# Patient Record
Sex: MALE | Race: WHITE | NOT HISPANIC OR LATINO | Employment: FULL TIME | URBAN - METROPOLITAN AREA
[De-identification: names, ages, dates, MRNs, and addresses within clinical notes are randomized per-mention and may not be internally consistent; named-entity substitution may affect disease eponyms.]

---

## 2023-08-10 ENCOUNTER — LAB (OUTPATIENT)
Dept: LAB | Facility: LAB | Age: 33
End: 2023-08-10
Payer: COMMERCIAL

## 2023-08-10 ENCOUNTER — OFFICE VISIT (OUTPATIENT)
Dept: PRIMARY CARE | Facility: CLINIC | Age: 33
End: 2023-08-10
Payer: COMMERCIAL

## 2023-08-10 VITALS
WEIGHT: 201.2 LBS | SYSTOLIC BLOOD PRESSURE: 108 MMHG | BODY MASS INDEX: 28.8 KG/M2 | DIASTOLIC BLOOD PRESSURE: 71 MMHG | TEMPERATURE: 98.3 F | HEIGHT: 70 IN | HEART RATE: 74 BPM | OXYGEN SATURATION: 96 %

## 2023-08-10 DIAGNOSIS — K92.1 HEMATOCHEZIA: ICD-10-CM

## 2023-08-10 DIAGNOSIS — R19.7 DIARRHEA, UNSPECIFIED TYPE: Primary | ICD-10-CM

## 2023-08-10 DIAGNOSIS — R19.7 DIARRHEA, UNSPECIFIED TYPE: ICD-10-CM

## 2023-08-10 LAB
ALANINE AMINOTRANSFERASE (SGPT) (U/L) IN SER/PLAS: 22 U/L (ref 10–52)
ALBUMIN (G/DL) IN SER/PLAS: 4.6 G/DL (ref 3.4–5)
ALKALINE PHOSPHATASE (U/L) IN SER/PLAS: 56 U/L (ref 33–120)
ANION GAP IN SER/PLAS: 10 MMOL/L (ref 10–20)
ASPARTATE AMINOTRANSFERASE (SGOT) (U/L) IN SER/PLAS: 19 U/L (ref 9–39)
BASOPHILS (10*3/UL) IN BLOOD BY AUTOMATED COUNT: 0.08 X10E9/L (ref 0–0.1)
BASOPHILS/100 LEUKOCYTES IN BLOOD BY AUTOMATED COUNT: 1.5 % (ref 0–2)
BILIRUBIN TOTAL (MG/DL) IN SER/PLAS: 0.5 MG/DL (ref 0–1.2)
CALCIUM (MG/DL) IN SER/PLAS: 9.7 MG/DL (ref 8.6–10.3)
CARBON DIOXIDE, TOTAL (MMOL/L) IN SER/PLAS: 29 MMOL/L (ref 21–32)
CHLORIDE (MMOL/L) IN SER/PLAS: 104 MMOL/L (ref 98–107)
CREATININE (MG/DL) IN SER/PLAS: 1.18 MG/DL (ref 0.5–1.3)
DEAMIDATED GLIADIN PEPTIDE IGA: <1 U/ML (ref 0–14)
DEAMIDATED GLIADIN PEPTIDE IGG: <1 U/ML (ref 0–14)
EOSINOPHILS (10*3/UL) IN BLOOD BY AUTOMATED COUNT: 0.16 X10E9/L (ref 0–0.7)
EOSINOPHILS/100 LEUKOCYTES IN BLOOD BY AUTOMATED COUNT: 2.9 % (ref 0–6)
ERYTHROCYTE DISTRIBUTION WIDTH (RATIO) BY AUTOMATED COUNT: 12.9 % (ref 11.5–14.5)
ERYTHROCYTE MEAN CORPUSCULAR HEMOGLOBIN CONCENTRATION (G/DL) BY AUTOMATED: 31.7 G/DL (ref 32–36)
ERYTHROCYTE MEAN CORPUSCULAR VOLUME (FL) BY AUTOMATED COUNT: 91 FL (ref 80–100)
ERYTHROCYTES (10*6/UL) IN BLOOD BY AUTOMATED COUNT: 5.21 X10E12/L (ref 4.5–5.9)
GFR MALE: 84 ML/MIN/1.73M2
GLUCOSE (MG/DL) IN SER/PLAS: 97 MG/DL (ref 74–99)
HEMATOCRIT (%) IN BLOOD BY AUTOMATED COUNT: 47.6 % (ref 41–52)
HEMOGLOBIN (G/DL) IN BLOOD: 15.1 G/DL (ref 13.5–17.5)
IMMATURE GRANULOCYTES/100 LEUKOCYTES IN BLOOD BY AUTOMATED COUNT: 0.2 % (ref 0–0.9)
LEUKOCYTES (10*3/UL) IN BLOOD BY AUTOMATED COUNT: 5.4 X10E9/L (ref 4.4–11.3)
LYMPHOCYTES (10*3/UL) IN BLOOD BY AUTOMATED COUNT: 1.84 X10E9/L (ref 1.2–4.8)
LYMPHOCYTES/100 LEUKOCYTES IN BLOOD BY AUTOMATED COUNT: 33.9 % (ref 13–44)
MONOCYTES (10*3/UL) IN BLOOD BY AUTOMATED COUNT: 0.62 X10E9/L (ref 0.1–1)
MONOCYTES/100 LEUKOCYTES IN BLOOD BY AUTOMATED COUNT: 11.4 % (ref 2–10)
NEUTROPHILS (10*3/UL) IN BLOOD BY AUTOMATED COUNT: 2.72 X10E9/L (ref 1.2–7.7)
NEUTROPHILS/100 LEUKOCYTES IN BLOOD BY AUTOMATED COUNT: 50.1 % (ref 40–80)
PLATELETS (10*3/UL) IN BLOOD AUTOMATED COUNT: 210 X10E9/L (ref 150–450)
POTASSIUM (MMOL/L) IN SER/PLAS: 4.3 MMOL/L (ref 3.5–5.3)
PROTEIN TOTAL: 6.9 G/DL (ref 6.4–8.2)
SEDIMENTATION RATE, ERYTHROCYTE: 2 MM/H (ref 0–15)
SODIUM (MMOL/L) IN SER/PLAS: 139 MMOL/L (ref 136–145)
THYROTROPIN (MIU/L) IN SER/PLAS BY DETECTION LIMIT <= 0.05 MIU/L: 1.78 MIU/L (ref 0.44–3.98)
THYROXINE (T4) FREE (NG/DL) IN SER/PLAS: 0.94 NG/DL (ref 0.61–1.12)
TISSUE TRANSGLUTAMINASE IGG: <1 U/ML (ref 0–14)
TISSUE TRANSGLUTAMINASE, IGA: <1 U/ML (ref 0–14)
UREA NITROGEN (MG/DL) IN SER/PLAS: 24 MG/DL (ref 6–23)

## 2023-08-10 PROCEDURE — 85652 RBC SED RATE AUTOMATED: CPT

## 2023-08-10 PROCEDURE — 84443 ASSAY THYROID STIM HORMONE: CPT

## 2023-08-10 PROCEDURE — 84439 ASSAY OF FREE THYROXINE: CPT

## 2023-08-10 PROCEDURE — 99213 OFFICE O/P EST LOW 20 MIN: CPT | Performed by: FAMILY MEDICINE

## 2023-08-10 PROCEDURE — 83516 IMMUNOASSAY NONANTIBODY: CPT

## 2023-08-10 PROCEDURE — 36415 COLL VENOUS BLD VENIPUNCTURE: CPT

## 2023-08-10 PROCEDURE — 80053 COMPREHEN METABOLIC PANEL: CPT

## 2023-08-10 PROCEDURE — 85025 COMPLETE CBC W/AUTO DIFF WBC: CPT

## 2023-08-10 PROCEDURE — 1036F TOBACCO NON-USER: CPT | Performed by: FAMILY MEDICINE

## 2023-08-10 ASSESSMENT — ENCOUNTER SYMPTOMS
FREQUENCY: 0
JOINT SWELLING: 0
FEVER: 0
FATIGUE: 0
DIFFICULTY URINATING: 0
ARTHRALGIAS: 0
DIAPHORESIS: 0
UNEXPECTED WEIGHT CHANGE: 0
APPETITE CHANGE: 0

## 2023-08-10 NOTE — PROGRESS NOTES
"Subjective   Patient ID: Dany Lawson is a 33 y.o. male who presents for Establish Care (To establish care. Discuss stomach discomfort and frequent bowel movements.  Has had blood in stool about 1 month ago.  ).    New patient.     Has always had some GI issues. Defecates 3 times in am every day, then more rest of day. 7-8 times a day. Soft, not liquid. Never totally solid. Has had blood in stool couple times. Last time one month ago. Has happened 2 times when stool is pellety, wipes and has blood. No rectal or abdominal pain. Sour stomach at times. No nausea, vomiting or weight loss. No family history of colon issues. Diet does not affect stool patterns. Has tried cutting back on dairy. Not cut out bread or wheat   products.     Dry spot in antecubital space left arm. Clears up with Eucerin.          No current outpatient medications on file.    Patient Active Problem List   Diagnosis    Diarrhea    Hematochezia         Review of Systems   Constitutional:  Negative for appetite change, diaphoresis, fatigue, fever and unexpected weight change.   Genitourinary:  Negative for difficulty urinating and frequency.   Musculoskeletal:  Negative for arthralgias and joint swelling.   Skin:  Negative for rash.       Objective   /71 (BP Location: Left arm, Patient Position: Sitting)   Pulse 74   Temp 36.8 °C (98.3 °F)   Ht 1.778 m (5' 10\")   Wt 91.3 kg (201 lb 3.2 oz)   SpO2 96%   BMI 28.87 kg/m²     Physical Exam  Vitals reviewed.   Constitutional:       General: He is not in acute distress.     Appearance: He is not ill-appearing.   Neck:      Vascular: No carotid bruit.   Cardiovascular:      Rate and Rhythm: Normal rate and regular rhythm.      Pulses: Normal pulses.      Heart sounds: No murmur heard.  Pulmonary:      Effort: Pulmonary effort is normal.      Breath sounds: Normal breath sounds.   Abdominal:      General: Abdomen is flat. Bowel sounds are normal.      Palpations: Abdomen is soft. "   Musculoskeletal:      Left lower leg: No edema.   Skin:     Findings: No rash.   Neurological:      Mental Status: He is alert.   Psychiatric:         Mood and Affect: Mood normal.         Assessment/Plan   Problem List Items Addressed This Visit       Diarrhea - Primary     Chronic, ?inflammatory bowel disease or Crohns. Check labs, Refer to Gi.          Relevant Orders    CBC and Auto Differential    Sedimentation Rate    Celiac Panel    Thyroid Stimulating Hormone    Thyroxine, Free    Comprehensive Metabolic Panel    Referral to Gastroenterology    Hematochezia     Refer to GI. Etiology unclear.          Relevant Orders    CBC and Auto Differential    Referral to Gastroenterology         Assessment, plans, tests, and follow up discussed with patient and patient verbalized understanding. Dany was given an opportunity to ask questions and  any concerns were addressed including but not limited to meds, follow up. .

## 2023-11-03 ENCOUNTER — LAB (OUTPATIENT)
Dept: LAB | Facility: LAB | Age: 33
End: 2023-11-03
Payer: COMMERCIAL

## 2023-11-03 DIAGNOSIS — K62.5 HEMORRHAGE OF ANUS AND RECTUM: Primary | ICD-10-CM

## 2023-11-03 PROCEDURE — 83993 ASSAY FOR CALPROTECTIN FECAL: CPT

## 2023-11-06 ENCOUNTER — TELEPHONE (OUTPATIENT)
Dept: GASTROENTEROLOGY | Facility: CLINIC | Age: 33
End: 2023-11-06
Payer: COMMERCIAL

## 2023-11-06 NOTE — LETTER
November 8, 2023     Dany Lawson    Patient: Dany Lawson   YOB: 1990   Date of Visit: 11/6/2023       To whom it may concern:    This patient has been under my care for some GI concerns which he is currently having evaluated. He is scheduled for a procedure and I am requesting that he be excused from jury duty from 11/27/2023 - 11/28/2023 for this procedure.     Please call me with any concerns [phone]     Sincerely,         Risa Hill, CNP        CC: No Recipients  ______________________________________________________________________________________

## 2023-11-08 LAB — CALPROTECTIN STL-MCNT: 23 UG/G

## 2023-11-27 ENCOUNTER — ANESTHESIA EVENT (OUTPATIENT)
Dept: GASTROENTEROLOGY | Facility: HOSPITAL | Age: 33
End: 2023-11-27
Payer: COMMERCIAL

## 2023-11-28 ENCOUNTER — ANESTHESIA (OUTPATIENT)
Dept: GASTROENTEROLOGY | Facility: HOSPITAL | Age: 33
End: 2023-11-28
Payer: COMMERCIAL

## 2023-11-28 ENCOUNTER — HOSPITAL ENCOUNTER (OUTPATIENT)
Dept: GASTROENTEROLOGY | Facility: HOSPITAL | Age: 33
Discharge: HOME | End: 2023-11-28
Payer: COMMERCIAL

## 2023-11-28 VITALS
BODY MASS INDEX: 27.3 KG/M2 | HEIGHT: 71 IN | TEMPERATURE: 98.3 F | WEIGHT: 195 LBS | OXYGEN SATURATION: 96 % | RESPIRATION RATE: 16 BRPM | SYSTOLIC BLOOD PRESSURE: 107 MMHG | HEART RATE: 58 BPM | DIASTOLIC BLOOD PRESSURE: 75 MMHG

## 2023-11-28 DIAGNOSIS — Z12.11 ENCOUNTER FOR SCREENING FOR MALIGNANT NEOPLASM OF COLON: ICD-10-CM

## 2023-11-28 DIAGNOSIS — R19.7 DIARRHEA, UNSPECIFIED TYPE: ICD-10-CM

## 2023-11-28 DIAGNOSIS — K62.5 RECTAL BLEEDING: ICD-10-CM

## 2023-11-28 PROCEDURE — 2500000004 HC RX 250 GENERAL PHARMACY W/ HCPCS (ALT 636 FOR OP/ED): Performed by: NURSE ANESTHETIST, CERTIFIED REGISTERED

## 2023-11-28 PROCEDURE — 0753T DGTZ GLS MCRSCP SLD LEVEL IV: CPT | Mod: TC,SUR,PORLAB | Performed by: INTERNAL MEDICINE

## 2023-11-28 PROCEDURE — 2500000005 HC RX 250 GENERAL PHARMACY W/O HCPCS: Performed by: NURSE ANESTHETIST, CERTIFIED REGISTERED

## 2023-11-28 PROCEDURE — 88305 TISSUE EXAM BY PATHOLOGIST: CPT | Performed by: SPECIALIST

## 2023-11-28 PROCEDURE — 7100000010 HC PHASE TWO TIME - EACH INCREMENTAL 1 MINUTE: Performed by: INTERNAL MEDICINE

## 2023-11-28 PROCEDURE — 3700000002 HC GENERAL ANESTHESIA TIME - EACH INCREMENTAL 1 MINUTE: Performed by: INTERNAL MEDICINE

## 2023-11-28 PROCEDURE — 88305 TISSUE EXAM BY PATHOLOGIST: CPT | Mod: TC,PORLAB | Performed by: INTERNAL MEDICINE

## 2023-11-28 PROCEDURE — 45380 COLONOSCOPY AND BIOPSY: CPT | Performed by: INTERNAL MEDICINE

## 2023-11-28 PROCEDURE — 2500000004 HC RX 250 GENERAL PHARMACY W/ HCPCS (ALT 636 FOR OP/ED): Performed by: INTERNAL MEDICINE

## 2023-11-28 PROCEDURE — 7100000009 HC PHASE TWO TIME - INITIAL BASE CHARGE: Performed by: INTERNAL MEDICINE

## 2023-11-28 PROCEDURE — 94760 N-INVAS EAR/PLS OXIMETRY 1: CPT

## 2023-11-28 PROCEDURE — 3700000001 HC GENERAL ANESTHESIA TIME - INITIAL BASE CHARGE: Performed by: INTERNAL MEDICINE

## 2023-11-28 RX ORDER — PROPOFOL 10 MG/ML
INJECTION, EMULSION INTRAVENOUS AS NEEDED
Status: DISCONTINUED | OUTPATIENT
Start: 2023-11-28 | End: 2023-11-28

## 2023-11-28 RX ORDER — FENTANYL CITRATE 50 UG/ML
INJECTION, SOLUTION INTRAMUSCULAR; INTRAVENOUS AS NEEDED
Status: DISCONTINUED | OUTPATIENT
Start: 2023-11-28 | End: 2023-11-28

## 2023-11-28 RX ORDER — NAPROXEN SODIUM 220 MG/1
TABLET ORAL
COMMUNITY

## 2023-11-28 RX ORDER — FERROUS SULFATE, DRIED 160(50) MG
1 TABLET, EXTENDED RELEASE ORAL DAILY
COMMUNITY

## 2023-11-28 RX ORDER — GUAIFENESIN 600 MG/1
1200 TABLET, EXTENDED RELEASE ORAL 2 TIMES DAILY
COMMUNITY
End: 2024-06-07 | Stop reason: ALTCHOICE

## 2023-11-28 RX ORDER — DIPHENHYDRAMINE HCL 25 MG
25 TABLET ORAL
COMMUNITY
End: 2024-06-07 | Stop reason: ALTCHOICE

## 2023-11-28 RX ORDER — LIDOCAINE HYDROCHLORIDE 20 MG/ML
INJECTION, SOLUTION EPIDURAL; INFILTRATION; INTRACAUDAL; PERINEURAL AS NEEDED
Status: DISCONTINUED | OUTPATIENT
Start: 2023-11-28 | End: 2023-11-28

## 2023-11-28 RX ORDER — SODIUM CHLORIDE 9 MG/ML
20 INJECTION, SOLUTION INTRAVENOUS CONTINUOUS
Status: DISCONTINUED | OUTPATIENT
Start: 2023-11-28 | End: 2023-11-29 | Stop reason: HOSPADM

## 2023-11-28 RX ADMIN — PROPOFOL 50 MG: 10 INJECTION, EMULSION INTRAVENOUS at 08:14

## 2023-11-28 RX ADMIN — PROPOFOL 50 MG: 10 INJECTION, EMULSION INTRAVENOUS at 08:12

## 2023-11-28 RX ADMIN — PROPOFOL 100 MG: 10 INJECTION, EMULSION INTRAVENOUS at 08:04

## 2023-11-28 RX ADMIN — PROPOFOL 50 MG: 10 INJECTION, EMULSION INTRAVENOUS at 08:09

## 2023-11-28 RX ADMIN — PROPOFOL 50 MG: 10 INJECTION, EMULSION INTRAVENOUS at 08:06

## 2023-11-28 RX ADMIN — SODIUM CHLORIDE 20 ML/HR: 9 INJECTION, SOLUTION INTRAVENOUS at 07:24

## 2023-11-28 RX ADMIN — PROPOFOL 50 MG: 10 INJECTION, EMULSION INTRAVENOUS at 08:08

## 2023-11-28 RX ADMIN — FENTANYL CITRATE 25 MCG: 50 INJECTION, SOLUTION INTRAMUSCULAR; INTRAVENOUS at 08:09

## 2023-11-28 RX ADMIN — LIDOCAINE HYDROCHLORIDE 40 MG: 20 INJECTION, SOLUTION EPIDURAL; INFILTRATION; INTRACAUDAL; PERINEURAL at 08:04

## 2023-11-28 RX ADMIN — FENTANYL CITRATE 25 MCG: 50 INJECTION, SOLUTION INTRAMUSCULAR; INTRAVENOUS at 08:12

## 2023-11-28 RX ADMIN — FENTANYL CITRATE 50 MCG: 50 INJECTION, SOLUTION INTRAMUSCULAR; INTRAVENOUS at 08:04

## 2023-11-28 RX ADMIN — PROPOFOL 50 MG: 10 INJECTION, EMULSION INTRAVENOUS at 08:16

## 2023-11-28 SDOH — HEALTH STABILITY: MENTAL HEALTH: CURRENT SMOKER: 0

## 2023-11-28 ASSESSMENT — PAIN SCALES - GENERAL
PAIN_LEVEL: 0
PAINLEVEL_OUTOF10: 0 - NO PAIN

## 2023-11-28 ASSESSMENT — COLUMBIA-SUICIDE SEVERITY RATING SCALE - C-SSRS
1. IN THE PAST MONTH, HAVE YOU WISHED YOU WERE DEAD OR WISHED YOU COULD GO TO SLEEP AND NOT WAKE UP?: NO
6. HAVE YOU EVER DONE ANYTHING, STARTED TO DO ANYTHING, OR PREPARED TO DO ANYTHING TO END YOUR LIFE?: NO
2. HAVE YOU ACTUALLY HAD ANY THOUGHTS OF KILLING YOURSELF?: NO

## 2023-11-28 ASSESSMENT — PAIN - FUNCTIONAL ASSESSMENT
PAIN_FUNCTIONAL_ASSESSMENT: 0-10

## 2023-11-28 NOTE — ANESTHESIA POSTPROCEDURE EVALUATION
Patient: Dany Lawson    Procedure Summary       Date: 11/28/23 Room / Location: [facility]    Anesthesia Start: 0758 Anesthesia Stop: 0824    Procedure: COLONOSCOPY Diagnosis: Encounter for screening for malignant neoplasm of colon    Scheduled Providers: Rafita Mccallum DO Responsible Provider: VIKY Fernandez    Anesthesia Type: MAC ASA Status: 1            Anesthesia Type: MAC    Vitals Value Taken Time   /64 11/28/23 0824   Temp 36.3 °C (97.3 °F) 11/28/23 0824   Pulse 53 11/28/23 0825   Resp 16 11/28/23 0824   SpO2 92 % 11/28/23 0824       Anesthesia Post Evaluation    Patient location during evaluation: bedside  Patient participation: complete - patient participated  Level of consciousness: awake and alert  Pain score: 0  Pain management: adequate  Airway patency: patent  Cardiovascular status: acceptable  Respiratory status: acceptable  Hydration status: acceptable  Postoperative Nausea and Vomiting: none        There were no known notable events for this encounter.

## 2023-11-28 NOTE — H&P
History Of Present Illness  Dany Lawson is a 33 y.o. male presenting with rectal bleeding  and intermittent diarrhea and loose stools.     Past Medical History  Past Medical History:   Diagnosis Date    Hematochezia 08/10/2023       Surgical History  Past Surgical History:   Procedure Laterality Date    HERNIA REPAIR  2007    WISDOM TOOTH EXTRACTION  2011        Social History  He reports that he has never smoked. He has never used smokeless tobacco. He reports current alcohol use. He reports that he does not use drugs.    Family History  Family History   Problem Relation Name Age of Onset    Diabetes Father      Cancer Paternal Grandmother      Cancer Paternal Grandfather          Allergies  Patient has no known allergies.    Review of Systems   All other systems reviewed and are negative.       Physical Exam  Constitutional:       Appearance: Normal appearance.   HENT:      Mouth/Throat:      Mouth: Mucous membranes are moist.   Eyes:      Extraocular Movements: Extraocular movements intact.   Cardiovascular:      Rate and Rhythm: Normal rate and regular rhythm.      Heart sounds: Normal heart sounds.   Pulmonary:      Effort: Pulmonary effort is normal. No respiratory distress.      Breath sounds: Normal breath sounds. No wheezing.   Abdominal:      General: Abdomen is flat. Bowel sounds are normal. There is no distension.      Palpations: Abdomen is soft.      Tenderness: There is no abdominal tenderness. There is no rebound.   Musculoskeletal:         General: Normal range of motion.   Skin:     General: Skin is warm and dry.   Neurological:      General: No focal deficit present.      Mental Status: He is alert and oriented to person, place, and time. Mental status is at baseline.   Psychiatric:         Mood and Affect: Mood normal.         Behavior: Behavior normal.          Last Recorded Vitals  Blood pressure 126/87, pulse 62, temperature 36.6 °C (97.9 °F), temperature source Temporal, resp. rate 16,  "height 1.803 m (5' 11\"), weight 88.5 kg (195 lb), SpO2 98 %.    Relevant Results        Current Outpatient Medications   Medication Instructions    calcium carbonate-vitamin D3 500 mg-5 mcg (200 unit) tablet 1 tablet, oral, Daily    diphenhydrAMINE (SOMINEX) 25 mg, oral    guaiFENesin (MUCINEX) 1,200 mg, oral, 2 times daily, Do not crush, chew, or split.    omega 3-dha-epa-fish oil (Fish OiL) 1,200 (144-216) mg capsule oral          Assessment/Plan     Rectal Bleeding/Intermittent diarrhea loose stools   - colon for liset Mccallum, DO    "

## 2023-11-28 NOTE — ANESTHESIA PREPROCEDURE EVALUATION
Patient: Dany Lawson    Procedure Information       Date/Time: 11/28/23 0815    Scheduled providers: Rafita Mccallum DO    Procedure: COLONOSCOPY    Location: [facility]            Relevant Problems   Anesthesia (within normal limits)      Cardiovascular (within normal limits)      Endocrine (within normal limits)      GI (within normal limits)      /Renal (within normal limits)      Neuro/Psych (within normal limits)      Pulmonary (within normal limits)      GI/Hepatic (within normal limits)      Hematology (within normal limits)      Musculoskeletal (within normal limits)      Eyes, Ears, Nose, and Throat (within normal limits)      Infectious Disease (within normal limits)       Clinical information reviewed:   Tobacco  Allergies  Meds   Med Hx  Surg Hx   Fam Hx  Soc Hx        NPO Detail:  NPO/Void Status  Date of Last Liquid: 11/28/23  Time of Last Liquid: 0230  Date of Last Solid: 11/26/23  Last Intake Type: Clear fluids; GI prep         Physical Exam    Airway  Mallampati: II  TM distance: >3 FB  Neck ROM: full     Cardiovascular - normal exam  Rhythm: regular  Rate: normal     Dental - normal exam     Pulmonary - normal exam     Abdominal - normal exam             Anesthesia Plan    ASA 1     MAC     The patient is not a current smoker.    intravenous induction   Anesthetic plan and risks discussed with patient.

## 2023-12-06 LAB
LABORATORY COMMENT REPORT: NORMAL
PATH REPORT.FINAL DX SPEC: NORMAL
PATH REPORT.GROSS SPEC: NORMAL
PATH REPORT.RELEVANT HX SPEC: NORMAL
PATH REPORT.TOTAL CANCER: NORMAL

## 2024-01-02 ENCOUNTER — TELEPHONE (OUTPATIENT)
Dept: GASTROENTEROLOGY | Facility: CLINIC | Age: 34
End: 2024-01-02
Payer: COMMERCIAL

## 2024-01-02 NOTE — TELEPHONE ENCOUNTER
Patient called and states that he came to the office because he was having diarrhea and rectal bleeding. He said his insurance will not cover the colonoscopy because it was billed as a screening and they told him he does not meet criteria for a screening. Patient wants to know if you could change the billing/coding to reflect diarrhea and rectal bleeding? Please advise

## 2024-01-04 NOTE — ADDENDUM NOTE
Encounter addended by: Rafita Mccallum,  on: 1/4/2024 2:17 PM   Actions taken: Order list changed, SmartForm saved, Image edited, Order Reconciliation Section accessed

## 2024-01-17 ENCOUNTER — OFFICE VISIT (OUTPATIENT)
Dept: PODIATRY | Facility: CLINIC | Age: 34
End: 2024-01-17
Payer: COMMERCIAL

## 2024-01-17 VITALS — DIASTOLIC BLOOD PRESSURE: 80 MMHG | SYSTOLIC BLOOD PRESSURE: 120 MMHG

## 2024-01-17 DIAGNOSIS — B07.0 PLANTAR WART OF LEFT FOOT: Primary | ICD-10-CM

## 2024-01-17 DIAGNOSIS — M79.672 LEFT FOOT PAIN: ICD-10-CM

## 2024-01-17 PROCEDURE — 1036F TOBACCO NON-USER: CPT | Performed by: PODIATRIST

## 2024-01-17 PROCEDURE — 99202 OFFICE O/P NEW SF 15 MIN: CPT | Performed by: PODIATRIST

## 2024-01-17 RX ORDER — IMIQUIMOD 12.5 MG/.25G
CREAM TOPICAL NIGHTLY
Qty: 12 PACKET | Refills: 3 | Status: SHIPPED | OUTPATIENT
Start: 2024-01-17 | End: 2024-02-13 | Stop reason: SDUPTHER

## 2024-01-17 NOTE — PROGRESS NOTES
CC: wart left foot    HPI:  Patient seen for wart left foot, has been present for months, has tried otc meds, has not helped.    PCP: Dr. Lazcano  Last visit: 9-18-23     PMH  Past Medical History:   Diagnosis Date    Hematochezia 08/10/2023     MEDS    Current Outpatient Medications:     calcium carbonate-vitamin D3 500 mg-5 mcg (200 unit) tablet, Take 1 tablet by mouth once daily., Disp: , Rfl:     diphenhydrAMINE (Sominex) 25 mg tablet, Take 1 tablet (25 mg) by mouth., Disp: , Rfl:     omega 3-dha-epa-fish oil (Fish OiL) 1,200 (144-216) mg capsule, Take by mouth., Disp: , Rfl:     guaiFENesin (Mucinex) 600 mg 12 hr tablet, Take 2 tablets (1,200 mg) by mouth 2 times a day. Do not crush, chew, or split., Disp: , Rfl:   Allergies  No Known Allergies  Social History     Socioeconomic History    Marital status:      Spouse name: None    Number of children: None    Years of education: None    Highest education level: None   Occupational History    Occupation: TEACHER   Tobacco Use    Smoking status: Never    Smokeless tobacco: Never   Vaping Use    Vaping Use: Never used   Substance and Sexual Activity    Alcohol use: Yes     Comment: socially    Drug use: Never    Sexual activity: Yes   Other Topics Concern    None   Social History Narrative    None     Social Determinants of Health     Financial Resource Strain: Not on file   Food Insecurity: Not on file   Transportation Needs: Not on file   Physical Activity: Not on file   Stress: Not on file   Social Connections: Not on file   Intimate Partner Violence: Not on file   Housing Stability: Not on file     Family History   Problem Relation Name Age of Onset    Diabetes Father      Cancer Paternal Grandmother      Cancer Paternal Grandfather       Past Surgical History:   Procedure Laterality Date    HERNIA REPAIR  2007    WISDOM TOOTH EXTRACTION  2011       REVIEW OF SYSTEMS    + as noted above in HPI.       Physical examination:   On General Observation:  Patient is a pleasant, cooperative, well developed 33 y.o.  adult male. The patient is alert and oriented to time, place and person. Patient has normal affect and mood.  /80     Vascular:  DP and PT pulses are 2/4 b/l.  no edema noted. no varicosities b/l.  CFT  5 seconds to all digits bilateral.  Skin temperature is warm to warm from proximal to distal bilateral.      Muscular: Strength is 5/5 b/l.  Motor strength is normal and symmetric proximally, as well as distally, in all four extremities. Good mobility of all extremities.  Tenderness to left foot.     Neuro:  Proprioception present.   Sensation to vibration is  intact. Protective sensation present  at all pedal sites via [place] Tobin 5.07 monofilament bilateral.  Light touch intact bilateral.     Derm:  lesion is present plantar left foot 2cm by 3cm with loss of rstl present and capillary budding present.        ASSESSMENT:    Plantar wart left foot  Pain left foot     PLAN:   Consult   Le exam  Reviewed etiology of wart and treatment options, recurrence rate.  Pt wants least painful treatment option  Will start mediplast and aldara treatment plan.  He will change the mediplast every 7 days and apply the aldara daily and secure in place.  Will follow up 2 weeks.      Filipe Solis DPM

## 2024-02-01 ENCOUNTER — APPOINTMENT (OUTPATIENT)
Dept: PODIATRY | Facility: CLINIC | Age: 34
End: 2024-02-01
Payer: COMMERCIAL

## 2024-02-12 ENCOUNTER — OFFICE VISIT (OUTPATIENT)
Dept: PODIATRY | Facility: CLINIC | Age: 34
End: 2024-02-12
Payer: COMMERCIAL

## 2024-02-12 VITALS — WEIGHT: 201.19 LBS | HEIGHT: 70 IN | RESPIRATION RATE: 16 BRPM | BODY MASS INDEX: 28.8 KG/M2

## 2024-02-12 DIAGNOSIS — B07.0 PLANTAR WART OF LEFT FOOT: Primary | ICD-10-CM

## 2024-02-12 PROCEDURE — 99212 OFFICE O/P EST SF 10 MIN: CPT | Performed by: PODIATRIST

## 2024-02-12 PROCEDURE — 1036F TOBACCO NON-USER: CPT | Performed by: PODIATRIST

## 2024-02-13 RX ORDER — IMIQUIMOD 12.5 MG/.25G
CREAM TOPICAL NIGHTLY
Qty: 12 PACKET | Refills: 3 | Status: SHIPPED | OUTPATIENT
Start: 2024-02-13 | End: 2024-04-08

## 2024-02-13 NOTE — PROGRESS NOTES
CC:  follow up warts    HPI:  Patient seen follow up wart left foot, using the aldara and mediplast, needs refill on the aldara.     PCP: Dr. Lazcano  Last visit: 2024     PMH  Past Medical History:   Diagnosis Date    Hematochezia 08/10/2023     MEDS    Current Outpatient Medications:     calcium carbonate-vitamin D3 500 mg-5 mcg (200 unit) tablet, Take 1 tablet by mouth once daily., Disp: , Rfl:     diphenhydrAMINE (Sominex) 25 mg tablet, Take 1 tablet (25 mg) by mouth., Disp: , Rfl:     guaiFENesin (Mucinex) 600 mg 12 hr tablet, Take 2 tablets (1,200 mg) by mouth 2 times a day. Do not crush, chew, or split., Disp: , Rfl:     imiquimod (Aldara) 5 % cream, Apply topically once daily at bedtime. Apply the aldara daily, change the mediplast every 7 days, secure in place with duct tape, Disp: 12 packet, Rfl: 3    omega 3-dha-epa-fish oil (Fish OiL) 1,200 (144-216) mg capsule, Take by mouth., Disp: , Rfl:   Allergies  No Known Allergies  Social History     Socioeconomic History    Marital status:      Spouse name: Not on file    Number of children: Not on file    Years of education: Not on file    Highest education level: Not on file   Occupational History    Occupation: TEACHER   Tobacco Use    Smoking status: Never    Smokeless tobacco: Never   Vaping Use    Vaping Use: Never used   Substance and Sexual Activity    Alcohol use: Yes     Comment: socially    Drug use: Never    Sexual activity: Yes   Other Topics Concern    Not on file   Social History Narrative    Not on file     Social Determinants of Health     Financial Resource Strain: Not on file   Food Insecurity: Not on file   Transportation Needs: Not on file   Physical Activity: Not on file   Stress: Not on file   Social Connections: Not on file   Intimate Partner Violence: Not on file   Housing Stability: Not on file     Family History   Problem Relation Name Age of Onset    Diabetes Father      Cancer Paternal Grandmother      Cancer Paternal  "Grandfather       Past Surgical History:   Procedure Laterality Date    HERNIA REPAIR  2007    WISDOM TOOTH EXTRACTION  2011       REVIEW OF SYSTEMS    + as noted above in HPI.       Physical examination:   On General Observation: Patient is a pleasant, cooperative, well developed 33 y.o.  adult male. The patient is alert and oriented to time, place and person. Patient has normal affect and mood.  Resp 16   Ht 1.77 m (5' 9.69\")   Wt 91.3 kg (201 lb 3.1 oz)   BMI 29.13 kg/m²     Vascular:  DP and PT pulses are 2/4 b/l.  no edema noted. no varicosities b/l.  CFT  5 seconds to all digits bilateral.  Skin temperature is warm to warm from proximal to distal bilateral.      Muscular: Strength is 5/5 b/l.  Motor strength is normal and symmetric proximally, as well as distally, in all four extremities. Good mobility of all extremities.      Neuro:  Proprioception present.   Sensation to vibration is  intact. Protective sensation present  at all pedal sites via [place] Tobin 5.07 monofilament bilateral.  Light touch intact bilateral.     Derm: wart present left plantar foot, maceration is present and capillary budding after debridement        ASSESSMENT:    Plantar wart left foot     PLAN:   Exam  Debrided the wart  Continue the aldara and mediplast  Will refill the aldara  Follow up 2 weeks      Filipe Solis DPM      "

## 2024-02-19 NOTE — TELEPHONE ENCOUNTER
Pt called and stated he called billing, and they told him that this is still pending. He is patiently waiting for this order to be fixed ,so billing is corrected 100% for his colonoscopy bill.    New order still needs fixed. Please advise.     Thank You!

## 2024-02-29 ENCOUNTER — OFFICE VISIT (OUTPATIENT)
Dept: PODIATRY | Facility: CLINIC | Age: 34
End: 2024-02-29
Payer: COMMERCIAL

## 2024-02-29 DIAGNOSIS — M79.672 LEFT FOOT PAIN: ICD-10-CM

## 2024-02-29 DIAGNOSIS — B07.0 PLANTAR WART OF LEFT FOOT: Primary | ICD-10-CM

## 2024-02-29 PROCEDURE — 1036F TOBACCO NON-USER: CPT | Performed by: PODIATRIST

## 2024-02-29 PROCEDURE — 99212 OFFICE O/P EST SF 10 MIN: CPT | Performed by: PODIATRIST

## 2024-02-29 NOTE — PROGRESS NOTES
CC: plantar wart left foot    HPI:  Patient seen for plantar wart left foot, using the aldara and mediplast.    PCP: Dr. Lazcano  Last visit: 2023     PMH  Past Medical History:   Diagnosis Date    Hematochezia 08/10/2023     MEDS    Current Outpatient Medications:     calcium carbonate-vitamin D3 500 mg-5 mcg (200 unit) tablet, Take 1 tablet by mouth once daily., Disp: , Rfl:     diphenhydrAMINE (Sominex) 25 mg tablet, Take 1 tablet (25 mg) by mouth., Disp: , Rfl:     guaiFENesin (Mucinex) 600 mg 12 hr tablet, Take 2 tablets (1,200 mg) by mouth 2 times a day. Do not crush, chew, or split., Disp: , Rfl:     imiquimod (Aldara) 5 % cream, Apply topically once daily at bedtime. Apply the aldara daily, change the mediplast every 7 days, secure in place with duct tape, Disp: 12 packet, Rfl: 3    omega 3-dha-epa-fish oil (Fish OiL) 1,200 (144-216) mg capsule, Take by mouth., Disp: , Rfl:   Allergies  No Known Allergies  Social History     Socioeconomic History    Marital status:      Spouse name: Not on file    Number of children: Not on file    Years of education: Not on file    Highest education level: Not on file   Occupational History    Occupation: TEACHER   Tobacco Use    Smoking status: Never    Smokeless tobacco: Never   Vaping Use    Vaping Use: Never used   Substance and Sexual Activity    Alcohol use: Yes     Comment: socially    Drug use: Never    Sexual activity: Yes   Other Topics Concern    Not on file   Social History Narrative    Not on file     Social Determinants of Health     Financial Resource Strain: Not on file   Food Insecurity: Not on file   Transportation Needs: Not on file   Physical Activity: Not on file   Stress: Not on file   Social Connections: Not on file   Intimate Partner Violence: Not on file   Housing Stability: Not on file     Family History   Problem Relation Name Age of Onset    Diabetes Father      Cancer Paternal Grandmother      Cancer Paternal Grandfather       Past  Surgical History:   Procedure Laterality Date    HERNIA REPAIR  2007    WISDOM TOOTH EXTRACTION  2011       REVIEW OF SYSTEMS    + as noted above in HPI.       Physical examination:   On General Observation: Patient is a pleasant, cooperative, well developed 33 y.o.  adult male. The patient is alert and oriented to time, place and person. Patient has normal affect and mood.  There were no vitals taken for this visit.    Vascular:  DP and PT pulses are 2/4 b/l.  no edema noted. no varicosities b/l.  CFT  5 seconds to all digits bilateral.  Skin temperature is warm to warm from proximal to distal bilateral.      Muscular: Strength is 5/5 b/l.  Motor strength is normal and symmetric proximally, as well as distally, in all four extremities. Good mobility of all extremities.     Neuro:  Proprioception present.   Sensation to vibration is  intact. Protective sensation present  at all pedal sites via [place] Tobin 5.07 monofilament bilateral.  Light touch intact bilateral.     Derm:  decreased capillary budding left plantar foot, lesion still present        ASSESSMENT:    Plantar wart left     PLAN:   Exam  Debrided the lesion with 15 blade  Continue the current aldara and sergio  Follow up 2 weeks      Filipe Solis DPM

## 2024-03-14 ENCOUNTER — OFFICE VISIT (OUTPATIENT)
Dept: PODIATRY | Facility: CLINIC | Age: 34
End: 2024-03-14
Payer: COMMERCIAL

## 2024-03-14 DIAGNOSIS — M79.672 LEFT FOOT PAIN: ICD-10-CM

## 2024-03-14 DIAGNOSIS — B07.0 PLANTAR WART OF LEFT FOOT: Primary | ICD-10-CM

## 2024-03-14 PROCEDURE — 99212 OFFICE O/P EST SF 10 MIN: CPT | Performed by: PODIATRIST

## 2024-03-14 PROCEDURE — 1036F TOBACCO NON-USER: CPT | Performed by: PODIATRIST

## 2024-03-14 NOTE — PROGRESS NOTES
CC: wart  left foot    HPI:  Patient seen for wart left foot, using the aldara and mediplast.    PCP: Dr. Lazcano  Last visit: 2023     PMH  Past Medical History:   Diagnosis Date    Hematochezia 08/10/2023     MEDS    Current Outpatient Medications:     calcium carbonate-vitamin D3 500 mg-5 mcg (200 unit) tablet, Take 1 tablet by mouth once daily., Disp: , Rfl:     diphenhydrAMINE (Sominex) 25 mg tablet, Take 1 tablet (25 mg) by mouth., Disp: , Rfl:     guaiFENesin (Mucinex) 600 mg 12 hr tablet, Take 2 tablets (1,200 mg) by mouth 2 times a day. Do not crush, chew, or split., Disp: , Rfl:     imiquimod (Aldara) 5 % cream, Apply topically once daily at bedtime. Apply the aldara daily, change the mediplast every 7 days, secure in place with duct tape, Disp: 12 packet, Rfl: 3    omega 3-dha-epa-fish oil (Fish OiL) 1,200 (144-216) mg capsule, Take by mouth., Disp: , Rfl:   Allergies  No Known Allergies  Social History     Socioeconomic History    Marital status:      Spouse name: Not on file    Number of children: Not on file    Years of education: Not on file    Highest education level: Not on file   Occupational History    Occupation: TEACHER   Tobacco Use    Smoking status: Never    Smokeless tobacco: Never   Vaping Use    Vaping Use: Never used   Substance and Sexual Activity    Alcohol use: Yes     Comment: socially    Drug use: Never    Sexual activity: Yes   Other Topics Concern    Not on file   Social History Narrative    Not on file     Social Determinants of Health     Financial Resource Strain: Not on file   Food Insecurity: Not on file   Transportation Needs: Not on file   Physical Activity: Not on file   Stress: Not on file   Social Connections: Not on file   Intimate Partner Violence: Not on file   Housing Stability: Not on file     Family History   Problem Relation Name Age of Onset    Diabetes Father      Cancer Paternal Grandmother      Cancer Paternal Grandfather       Past Surgical History:    Procedure Laterality Date    HERNIA REPAIR  2007    WISDOM TOOTH EXTRACTION  2011       REVIEW OF SYSTEMS    + as noted above in HPI.       Physical examination:   On General Observation: Patient is a pleasant, cooperative, well developed 33 y.o.  adult male. The patient is alert and oriented to time, place and person. Patient has normal affect and mood.  There were no vitals taken for this visit.    Vascular:  DP and PT pulses are 2/4 b/l.  no edema noted. no varicosities b/l.  CFT  5 seconds to all digits bilateral.  Skin temperature is warm to warm from proximal to distal bilateral.      Muscular: Strength is 5/5 b/l.  Motor strength is normal and symmetric proximally, as well as distally, in all four extremities. Good mobility of all extremities.  Tenderness to left foot.     Neuro:  Proprioception present.   Sensation to vibration is  present. Protective sensation intact  at all pedal sites via [place] Tobin 5.07 monofilament bilateral.  Light touch present bilateral.     Derm: lesion plantar left foot 1.5cm by 1.0cm with loss of rstl present, capillary budding present      ASSESSMENT:    Plantar wart left foot  Pain left foot    PLAN:   Exam  Debrided the wart with 15 blade, wart still present, continue the aldara and mediplast, follow up 2 weeks.      Filipe Solis DPM

## 2024-03-28 ENCOUNTER — OFFICE VISIT (OUTPATIENT)
Dept: PODIATRY | Facility: CLINIC | Age: 34
End: 2024-03-28
Payer: COMMERCIAL

## 2024-03-28 DIAGNOSIS — M79.672 LEFT FOOT PAIN: ICD-10-CM

## 2024-03-28 DIAGNOSIS — B07.0 PLANTAR WART OF LEFT FOOT: Primary | ICD-10-CM

## 2024-03-28 PROCEDURE — 99212 OFFICE O/P EST SF 10 MIN: CPT | Performed by: PODIATRIST

## 2024-03-28 NOTE — PROGRESS NOTES
CC: plantar warts left    HPI:  Patient seen for mediplast and aldara plantar warts left foot    PCP: Dr. Lazcano  Last visit: 11-3-23     PMH  Past Medical History:   Diagnosis Date    Hematochezia 08/10/2023     MEDS    Current Outpatient Medications:     calcium carbonate-vitamin D3 500 mg-5 mcg (200 unit) tablet, Take 1 tablet by mouth once daily., Disp: , Rfl:     diphenhydrAMINE (Sominex) 25 mg tablet, Take 1 tablet (25 mg) by mouth., Disp: , Rfl:     guaiFENesin (Mucinex) 600 mg 12 hr tablet, Take 2 tablets (1,200 mg) by mouth 2 times a day. Do not crush, chew, or split., Disp: , Rfl:     imiquimod (Aldara) 5 % cream, Apply topically once daily at bedtime. Apply the aldara daily, change the mediplast every 7 days, secure in place with duct tape, Disp: 12 packet, Rfl: 3    omega 3-dha-epa-fish oil (Fish OiL) 1,200 (144-216) mg capsule, Take by mouth., Disp: , Rfl:   Allergies  No Known Allergies  Social History     Socioeconomic History    Marital status:      Spouse name: Not on file    Number of children: Not on file    Years of education: Not on file    Highest education level: Not on file   Occupational History    Occupation: TEACHER   Tobacco Use    Smoking status: Never    Smokeless tobacco: Never   Vaping Use    Vaping Use: Never used   Substance and Sexual Activity    Alcohol use: Yes     Comment: socially    Drug use: Never    Sexual activity: Yes   Other Topics Concern    Not on file   Social History Narrative    Not on file     Social Determinants of Health     Financial Resource Strain: Not on file   Food Insecurity: Not on file   Transportation Needs: Not on file   Physical Activity: Not on file   Stress: Not on file   Social Connections: Not on file   Intimate Partner Violence: Not on file   Housing Stability: Not on file     Family History   Problem Relation Name Age of Onset    Diabetes Father      Cancer Paternal Grandmother      Cancer Paternal Grandfather       Past Surgical  History:   Procedure Laterality Date    HERNIA REPAIR  2007    WISDOM TOOTH EXTRACTION  2011       REVIEW OF SYSTEMS    + as noted above in HPI.       Physical examination:   On General Observation: Patient is a pleasant, cooperative, well developed 33 y.o.  adult male. The patient is alert and oriented to time, place and person. Patient has normal affect and mood.  There were no vitals taken for this visit.    Vascular:  DP and PT pulses are 2/4 b/l.  no edema noted. no varicosities b/l.  CFT  5 seconds to all digits bilateral.  Skin temperature is warm to warm from proximal to distal bilateral.      Muscular: Strength is 5/5 b/l.  Motor strength is normal and symmetric proximally, as well as distally, in all four extremities. Good mobility of all extremities.      Neuro:  Proprioception present.   Sensation to vibration is  present. Protective sensation intact  at all pedal sites via [place] Tobin 5.07 monofilament bilateral.  Light touch present bilateral.     Derm:  lesion present plantar left 1.3cm by 1.0 cm with capillary budding present and loss of rstl present after debridment        ASSESSMENT:    Plantar warts left  Pain left foot     PLAN:   Exam  Debrided warts left foot with 15 blade, continue the aldara and mediplast, recommend new mediplast, did review possible co2 laser ablation.  Filipe Solis DPM  '

## 2024-04-12 ENCOUNTER — OFFICE VISIT (OUTPATIENT)
Dept: PODIATRY | Facility: CLINIC | Age: 34
End: 2024-04-12
Payer: COMMERCIAL

## 2024-04-12 DIAGNOSIS — M79.672 LEFT FOOT PAIN: ICD-10-CM

## 2024-04-12 DIAGNOSIS — B07.0 PLANTAR WART OF LEFT FOOT: Primary | ICD-10-CM

## 2024-04-12 PROCEDURE — 99212 OFFICE O/P EST SF 10 MIN: CPT | Performed by: PODIATRIST

## 2024-04-15 NOTE — PROGRESS NOTES
CC: plantar warts left     HPI:  Patient seen for mediplast and aldara plantar warts left foot     PCP: Dr. Lazcano  Last visit: 11-3-23      PMH  Medical History        Past Medical History:   Diagnosis Date    Hematochezia 08/10/2023         MEDS     Current Outpatient Medications:     calcium carbonate-vitamin D3 500 mg-5 mcg (200 unit) tablet, Take 1 tablet by mouth once daily., Disp: , Rfl:     diphenhydrAMINE (Sominex) 25 mg tablet, Take 1 tablet (25 mg) by mouth., Disp: , Rfl:     guaiFENesin (Mucinex) 600 mg 12 hr tablet, Take 2 tablets (1,200 mg) by mouth 2 times a day. Do not crush, chew, or split., Disp: , Rfl:     imiquimod (Aldara) 5 % cream, Apply topically once daily at bedtime. Apply the aldara daily, change the mediplast every 7 days, secure in place with duct tape, Disp: 12 packet, Rfl: 3    omega 3-dha-epa-fish oil (Fish OiL) 1,200 (144-216) mg capsule, Take by mouth., Disp: , Rfl:   Allergies  No Known Allergies  Social History   Social History            Socioeconomic History    Marital status:        Spouse name: Not on file    Number of children: Not on file    Years of education: Not on file    Highest education level: Not on file   Occupational History    Occupation: TEACHER   Tobacco Use    Smoking status: Never    Smokeless tobacco: Never   Vaping Use    Vaping Use: Never used   Substance and Sexual Activity    Alcohol use: Yes       Comment: socially    Drug use: Never    Sexual activity: Yes   Other Topics Concern    Not on file   Social History Narrative    Not on file      Social Determinants of Health      Financial Resource Strain: Not on file   Food Insecurity: Not on file   Transportation Needs: Not on file   Physical Activity: Not on file   Stress: Not on file   Social Connections: Not on file   Intimate Partner Violence: Not on file   Housing Stability: Not on file         Family History          Family History   Problem Relation Name Age of Onset    Diabetes Father         Cancer Paternal Grandmother        Cancer Paternal Grandfather             Surgical History         Past Surgical History:   Procedure Laterality Date    HERNIA REPAIR   2007    WISDOM TOOTH EXTRACTION   2011            REVIEW OF SYSTEMS     + as noted above in HPI.         Physical examination:   On General Observation: Patient is a pleasant, cooperative, well developed 33 y.o.  adult male. The patient is alert and oriented to time, place and person. Patient has normal affect and mood.  There were no vitals taken for this visit.     Vascular:  DP and PT pulses are 2/4 b/l.  no edema noted. no varicosities b/l.  CFT  5 seconds to all digits bilateral.  Skin temperature is warm to warm from proximal to distal bilateral.       Muscular: Strength is 5/5 b/l.  Motor strength is normal and symmetric proximally, as well as distally, in all four extremities. Good mobility of all extremities.       Neuro:  Proprioception present.   Sensation to vibration is  present. Protective sensation intact  at all pedal sites via [place] Tobin 5.07 monofilament bilateral.  Light touch present bilateral.      Derm:  lesion present plantar left 1.3cm by 1.0 cm with capillary budding present and loss of rstl present after debridment           ASSESSMENT:    Plantar warts left  Pain left foot      PLAN:   Exam  Debrided the warts, there is still warts present and capillary budding present. Reviewed options, discussed either CO2 laser ablation or hyfraction as out pt procedure reviewed risks, benefits, complications, healing time and possible recurrence, pt understands and wishes to proceed, will need medical clearance prior and will schedule under IV sedation.

## 2024-05-02 ENCOUNTER — TELEPHONE (OUTPATIENT)
Dept: PRIMARY CARE | Facility: CLINIC | Age: 34
End: 2024-05-02
Payer: COMMERCIAL

## 2024-05-02 NOTE — TELEPHONE ENCOUNTER
----- Message from Lady Santana MA sent at 4/24/2024 12:24 PM EDT -----  Regarding: FW: Wart removal  Contact: [phone]    ----- Message -----  From: Dany Lawson  Sent: 4/24/2024  12:09 PM EDT  To: Do Paula G[facility] Clinical Support Staff  Subject: Wart removal                                     I called in, but I thought I needed to schedule through Dr. Solis to have my wart removed surgically. Please direct me for my next steps. Thank you.      Dany Lawson

## 2024-05-03 NOTE — TELEPHONE ENCOUNTER
CALLED PATIENT AND LET HIM KNOW THAT YOU WOULD BE CALLING HIM SOMETIME NEXT WEEK, WITH SOME POSSIBLE SURGERY DATES. HE LOOKS FORWARD TO YOUR CALL. THANK YOU!

## 2024-05-13 NOTE — TELEPHONE ENCOUNTER
----- Message from Filipe Solis DPM sent at 5/13/2024 12:11 PM EDT -----  Regarding: surgery  Please schedule at [facility], can fit in on a Monday, dx plantar warts left foot  Procedure is co2 laser ablation warts left foot  Need Fortec CO2 laser, MAC sedation, one hour and medical clearance thanks

## 2024-05-13 NOTE — TELEPHONE ENCOUNTER
PATIENT IS SCHEDULED FOR SURGERY AT [facility] ON 6/24/24 AT 9:30 AM    [facility]  [phone]  PER: SIVAKUMAR FELICIANO  $250 INDIVIDUAL DEDUCTIBLE, WHICH HAS BEEN MET  $750 CO INSURANCE, WHICH $95 HAS BEEN MET  $1000 OUT OF POCKET, OF WHICH $310.08 HAS BEEN MET  CALL [phone]  REFERENCE #[id number]    [phone]  PER: JEANETH MONTAÑO  CPT 13978  DX B07.0 AND M79.672 NO PRIOR AUTHORIZATION IS NEEDED  [facility] IS IN NETWORK  REFERENCE #[id number]    FAXED TO SURGERY CTR  FAXED TO FINANCIALS  FAXED PRE OP CLEARANCE TO DR JOSEP TRUJILLO, [phone]  CONFIRMED DATE AND TIME OF SURGERY  POST OP F/UP SCHEDULED ON 6/28/24 @ 2:00 PM-PAPERWORK  SENT

## 2024-05-30 ENCOUNTER — TELEPHONE (OUTPATIENT)
Dept: PRIMARY CARE | Facility: CLINIC | Age: 34
End: 2024-05-30
Payer: COMMERCIAL

## 2024-05-30 NOTE — TELEPHONE ENCOUNTER
LMOM FOR PATIENT THAT [facility] SURGERY HAS BEEN CANCELLED. TOLD HIM THAT WE WOULD CALL HIM BACK TO RESCHEDULE AT [facility]. PLEASE TRY TO SCHEDULE BOTH SURGERIES AT [facility] ON 7/10/24. THANK YOU!

## 2024-05-30 NOTE — TELEPHONE ENCOUNTER
PLEASE CALL TESS AT [facility] ABOUT PATIENTS SURGERY ON 6/24/24 @ 9:30 AM. WILL NEED FORTEC CO2 LASER. SAMANTHA AT [facility] CALLED ON 5/22/24 AND NEEDS TO KNOW IF SURGERY NEEDS TO BE CHANGED TO SURGICAL EXCISION, CANCELLED OR MOVED TO [facility] FOR POSSIBLY 7/10/24. PLEASE ADVISE ASAP. THANK YOU!

## 2024-05-30 NOTE — TELEPHONE ENCOUNTER
PLEASE RESCHEDULE ASAP AT [facility]. 6/26/24 OR SOONER. PATIENT WILL BE LEAVING FOR [place] ON 7/31/24. TOLD HIM PER OUR CONVERSATION, HE WILL NEED POSSIBLY 3 WEEKS RECOVERY. THANK YOU!

## 2024-05-31 ENCOUNTER — PREP FOR PROCEDURE (OUTPATIENT)
Dept: PODIATRY | Facility: CLINIC | Age: 34
End: 2024-05-31
Payer: COMMERCIAL

## 2024-06-05 NOTE — TELEPHONE ENCOUNTER
PATIENT CALLED AND LEFT MESSAGE. WOULD LIKE YOU TO CALL HIM ABOUT RESCHEDULING SURGERY. NEEDS TO KNOW SOMETHING ASAP. [phone]. THANKS!

## 2024-06-06 NOTE — TELEPHONE ENCOUNTER
PER OUR CONVERSATION. I CALLED PATIENT TO LET HIM KNOW TRHAT YOU WERE STILL TRYING TO GET HIM SCHEDULED AT [facility] ON EITHER 6/26/24 OR 7/10/24. HE SAID THAT THE 7/10/24 WILL NOT WORK. HE NEEDS IT TO BE 6/26/24 AT [facility] A WOULD APPRECIATE IT. TOLD HIM WE WOULD KEEP HIM UPDATED. THANK YOU!

## 2024-06-07 ENCOUNTER — OFFICE VISIT (OUTPATIENT)
Dept: PRIMARY CARE | Facility: CLINIC | Age: 34
End: 2024-06-07
Payer: COMMERCIAL

## 2024-06-07 VITALS
RESPIRATION RATE: 16 BRPM | BODY MASS INDEX: 28.55 KG/M2 | WEIGHT: 199.4 LBS | HEART RATE: 72 BPM | DIASTOLIC BLOOD PRESSURE: 81 MMHG | OXYGEN SATURATION: 95 % | SYSTOLIC BLOOD PRESSURE: 127 MMHG | HEIGHT: 70 IN | TEMPERATURE: 97.8 F

## 2024-06-07 DIAGNOSIS — Z01.818 PREOP EXAMINATION: ICD-10-CM

## 2024-06-07 DIAGNOSIS — B07.0 PLANTAR WART OF LEFT FOOT: Primary | ICD-10-CM

## 2024-06-07 PROCEDURE — 99214 OFFICE O/P EST MOD 30 MIN: CPT | Performed by: FAMILY MEDICINE

## 2024-06-07 RX ORDER — PSYLLIUM HUSK 3.4 G/5.8G
3400 POWDER ORAL 2 TIMES WEEKLY
COMMUNITY
Start: 2023-09-18

## 2024-06-07 ASSESSMENT — ENCOUNTER SYMPTOMS
NAUSEA: 0
SHORTNESS OF BREATH: 0
DIZZINESS: 0
POLYPHAGIA: 0
BRUISES/BLEEDS EASILY: 0
PALPITATIONS: 0
TROUBLE SWALLOWING: 0
APPETITE CHANGE: 0
DIARRHEA: 0
MYALGIAS: 0
HEADACHES: 0
POLYDIPSIA: 0
FATIGUE: 0
UNEXPECTED WEIGHT CHANGE: 0

## 2024-06-07 NOTE — PROGRESS NOTES
"Subjective   Patient ID: Dany Lawson is a 34 y.o. male who presents for Pre-op Exam (Pre -op and  left foot concern) and wart surgery.    He is here for preop eval for wart surgery. Bottom left foot.   Dr. Melendez. Discomfort left plantar. Tried OTC remedies which only made it worse.     Past Surgical History: No history of anesthesia issues.   2007: HERNIA REPAIR  2011: WISDOM TOOTH EXTRACTION    No smoking in past or current.   Alcohol 1-2 per week  No drug use.                    Current Outpatient Medications:     calcium carbonate-vitamin D3 500 mg-5 mcg (200 unit) tablet, Take 1 tablet by mouth once daily., Disp: , Rfl:     Metamucil Sugar-Free, aspart, 3.4 gram/5.8 gram powder, Take 3,400 mg by mouth 2 times a week., Disp: , Rfl:     omega 3-dha-epa-fish oil (Fish OiL) 1,200 (144-216) mg capsule, Take by mouth., Disp: , Rfl:     Patient Active Problem List   Diagnosis    Diarrhea    Hematochezia    Plantar wart of left foot    Preop examination         Review of Systems   Constitutional:  Negative for appetite change, fatigue and unexpected weight change.   HENT:  Negative for trouble swallowing.    Respiratory:  Negative for shortness of breath.    Cardiovascular:  Negative for chest pain, palpitations and leg swelling.   Gastrointestinal:  Negative for diarrhea and nausea.   Endocrine: Negative for cold intolerance, heat intolerance, polydipsia, polyphagia and polyuria.   Musculoskeletal:  Negative for myalgias.   Skin:  Negative for rash.   Neurological:  Negative for dizziness and headaches.   Hematological:  Does not bruise/bleed easily.       Objective   /81 (BP Location: Left arm, Patient Position: Sitting, BP Cuff Size: Large adult)   Pulse 72   Temp 36.6 °C (97.8 °F) (Temporal)   Resp 16   Ht 1.77 m (5' 9.69\")   Wt 90.4 kg (199 lb 6.4 oz)   SpO2 95%   BMI 28.87 kg/m²     Physical Exam  Vitals reviewed.   Constitutional:       General: He is not in acute distress.     Appearance: He " is not ill-appearing.   Neck:      Vascular: No carotid bruit.   Cardiovascular:      Rate and Rhythm: Normal rate and regular rhythm.      Pulses: Normal pulses.      Heart sounds: No murmur heard.  Pulmonary:      Effort: Pulmonary effort is normal.      Breath sounds: Normal breath sounds.   Musculoskeletal:      Right lower leg: No edema.      Left lower leg: No edema.   Skin:     Findings: No bruising or rash.   Neurological:      Mental Status: He is alert.   Psychiatric:         Mood and Affect: Mood normal.         Assessment/Plan   Problem List Items Addressed This Visit       Plantar wart of left foot - Primary     Low risk for anesthesia. Cleared for surgery.          Preop examination     Discussed need to hold Omega 3 for at least 7 days prior to surgery. Do not recommend until after healed.               Assessment, plans, tests, and follow up discussed with patient and patient verbalized understanding. Dany was given an opportunity to ask questions and  any concerns were addressed including but not limited to healthy lifestyle, preop clearance, follow up.

## 2024-06-07 NOTE — ASSESSMENT & PLAN NOTE
Discussed need to hold Omega 3 for at least 7 days prior to surgery. Do not recommend until after healed.

## 2024-06-20 ENCOUNTER — PREP FOR PROCEDURE (OUTPATIENT)
Dept: PODIATRY | Facility: CLINIC | Age: 34
End: 2024-06-20
Payer: COMMERCIAL

## 2024-06-20 ENCOUNTER — TELEPHONE (OUTPATIENT)
Dept: PRIMARY CARE | Facility: CLINIC | Age: 34
End: 2024-06-20
Payer: COMMERCIAL

## 2024-06-20 DIAGNOSIS — M79.672 PAIN IN LEFT FOOT: ICD-10-CM

## 2024-06-20 DIAGNOSIS — B07.0 PLANTAR WARTS: Primary | ICD-10-CM

## 2024-06-20 NOTE — TELEPHONE ENCOUNTER
PATIENT IS SCHEDULED FOR SURGERY AT [facility] ON 6/26/24 @ 1:00 PM.    [facility]  [phone]  PER: MIGDALIA ALBRECHT  EFFECTIVE 8/27/15  [facility] IS IN NETWORK  CPT 44047  DX B07.0 AND M79.672 NO PRIOR AUTHORIZATION IS NEEDED  REFERENCE #[id number]    [facility] FOR PATIENT TO CONFIRM SURGERY DATE AND TIME.  FAXED PRE OP CLEARANCE FROM DR JOSEP TRUJILLO TO [facility], [phone].  FAXED REFERENCE NUMBER TO FINANCIALS  POST OP F/UP SCHEDULED ON 7/1/24 @ 9:40 AM-PAPERWORK SENT OUT.

## 2024-06-26 ENCOUNTER — HOSPITAL ENCOUNTER (OUTPATIENT)
Facility: HOSPITAL | Age: 34
Setting detail: OUTPATIENT SURGERY
Discharge: HOME | End: 2024-06-26
Attending: PODIATRIST | Admitting: PODIATRIST
Payer: COMMERCIAL

## 2024-06-26 ENCOUNTER — ANESTHESIA EVENT (OUTPATIENT)
Dept: OPERATING ROOM | Facility: HOSPITAL | Age: 34
End: 2024-06-26
Payer: COMMERCIAL

## 2024-06-26 ENCOUNTER — ANESTHESIA (OUTPATIENT)
Dept: OPERATING ROOM | Facility: HOSPITAL | Age: 34
End: 2024-06-26
Payer: COMMERCIAL

## 2024-06-26 VITALS
HEART RATE: 66 BPM | TEMPERATURE: 96.8 F | SYSTOLIC BLOOD PRESSURE: 116 MMHG | RESPIRATION RATE: 14 BRPM | OXYGEN SATURATION: 96 % | BODY MASS INDEX: 28.15 KG/M2 | DIASTOLIC BLOOD PRESSURE: 69 MMHG | HEIGHT: 71 IN | WEIGHT: 201.06 LBS

## 2024-06-26 DIAGNOSIS — M79.672 PAIN IN LEFT FOOT: ICD-10-CM

## 2024-06-26 DIAGNOSIS — B07.0 PLANTAR WARTS: Primary | ICD-10-CM

## 2024-06-26 PROCEDURE — 2500000004 HC RX 250 GENERAL PHARMACY W/ HCPCS (ALT 636 FOR OP/ED): Performed by: PODIATRIST

## 2024-06-26 PROCEDURE — 17110 DESTRUCTION B9 LES UP TO 14: CPT | Performed by: PODIATRIST

## 2024-06-26 PROCEDURE — 7100000009 HC PHASE TWO TIME - INITIAL BASE CHARGE: Performed by: PODIATRIST

## 2024-06-26 PROCEDURE — 3700000002 HC GENERAL ANESTHESIA TIME - EACH INCREMENTAL 1 MINUTE: Performed by: PODIATRIST

## 2024-06-26 PROCEDURE — 3700000001 HC GENERAL ANESTHESIA TIME - INITIAL BASE CHARGE: Performed by: PODIATRIST

## 2024-06-26 PROCEDURE — 2500000005 HC RX 250 GENERAL PHARMACY W/O HCPCS: Performed by: PODIATRIST

## 2024-06-26 PROCEDURE — 3600000008 HC OR TIME - EACH INCREMENTAL 1 MINUTE - PROCEDURE LEVEL THREE: Performed by: PODIATRIST

## 2024-06-26 PROCEDURE — 2500000004 HC RX 250 GENERAL PHARMACY W/ HCPCS (ALT 636 FOR OP/ED): Performed by: ANESTHESIOLOGIST ASSISTANT

## 2024-06-26 PROCEDURE — 7100000002 HC RECOVERY ROOM TIME - EACH INCREMENTAL 1 MINUTE: Performed by: PODIATRIST

## 2024-06-26 PROCEDURE — 7100000010 HC PHASE TWO TIME - EACH INCREMENTAL 1 MINUTE: Performed by: PODIATRIST

## 2024-06-26 PROCEDURE — 3600000003 HC OR TIME - INITIAL BASE CHARGE - PROCEDURE LEVEL THREE: Performed by: PODIATRIST

## 2024-06-26 PROCEDURE — 7100000001 HC RECOVERY ROOM TIME - INITIAL BASE CHARGE: Performed by: PODIATRIST

## 2024-06-26 PROCEDURE — 2500000001 HC RX 250 WO HCPCS SELF ADMINISTERED DRUGS (ALT 637 FOR MEDICARE OP): Performed by: PODIATRIST

## 2024-06-26 PROCEDURE — 2720000007 HC OR 272 NO HCPCS: Performed by: PODIATRIST

## 2024-06-26 RX ORDER — SILVER SULFADIAZINE 10 G/1000G
CREAM TOPICAL AS NEEDED
Status: DISCONTINUED | OUTPATIENT
Start: 2024-06-26 | End: 2024-06-26 | Stop reason: HOSPADM

## 2024-06-26 RX ORDER — FENTANYL CITRATE 50 UG/ML
INJECTION, SOLUTION INTRAMUSCULAR; INTRAVENOUS AS NEEDED
Status: DISCONTINUED | OUTPATIENT
Start: 2024-06-26 | End: 2024-06-26

## 2024-06-26 RX ORDER — PROPOFOL 10 MG/ML
INJECTION, EMULSION INTRAVENOUS AS NEEDED
Status: DISCONTINUED | OUTPATIENT
Start: 2024-06-26 | End: 2024-06-26

## 2024-06-26 RX ORDER — SODIUM CHLORIDE 0.9 G/100ML
IRRIGANT IRRIGATION AS NEEDED
Status: DISCONTINUED | OUTPATIENT
Start: 2024-06-26 | End: 2024-06-26 | Stop reason: HOSPADM

## 2024-06-26 RX ORDER — SODIUM CHLORIDE, SODIUM LACTATE, POTASSIUM CHLORIDE, CALCIUM CHLORIDE 600; 310; 30; 20 MG/100ML; MG/100ML; MG/100ML; MG/100ML
INJECTION, SOLUTION INTRAVENOUS CONTINUOUS PRN
Status: DISCONTINUED | OUTPATIENT
Start: 2024-06-26 | End: 2024-06-26

## 2024-06-26 RX ORDER — SILVER NITRATE 38.21; 12.74 MG/1; MG/1
STICK TOPICAL AS NEEDED
Status: DISCONTINUED | OUTPATIENT
Start: 2024-06-26 | End: 2024-06-26 | Stop reason: HOSPADM

## 2024-06-26 RX ORDER — LABETALOL HYDROCHLORIDE 5 MG/ML
5 INJECTION, SOLUTION INTRAVENOUS ONCE AS NEEDED
Status: CANCELLED | OUTPATIENT
Start: 2024-06-26

## 2024-06-26 RX ORDER — SODIUM CHLORIDE, SODIUM LACTATE, POTASSIUM CHLORIDE, CALCIUM CHLORIDE 600; 310; 30; 20 MG/100ML; MG/100ML; MG/100ML; MG/100ML
100 INJECTION, SOLUTION INTRAVENOUS CONTINUOUS
Status: CANCELLED | OUTPATIENT
Start: 2024-06-26

## 2024-06-26 RX ORDER — LIDOCAINE HYDROCHLORIDE 10 MG/ML
0.1 INJECTION, SOLUTION EPIDURAL; INFILTRATION; INTRACAUDAL; PERINEURAL ONCE
Status: CANCELLED | OUTPATIENT
Start: 2024-06-26 | End: 2024-06-26

## 2024-06-26 RX ORDER — ONDANSETRON HYDROCHLORIDE 2 MG/ML
INJECTION, SOLUTION INTRAVENOUS AS NEEDED
Status: DISCONTINUED | OUTPATIENT
Start: 2024-06-26 | End: 2024-06-26

## 2024-06-26 RX ORDER — OXYCODONE HYDROCHLORIDE 5 MG/1
5 TABLET ORAL EVERY 4 HOURS PRN
Status: CANCELLED | OUTPATIENT
Start: 2024-06-26

## 2024-06-26 RX ORDER — DIPHENHYDRAMINE HYDROCHLORIDE 50 MG/ML
12.5 INJECTION INTRAMUSCULAR; INTRAVENOUS ONCE AS NEEDED
Status: CANCELLED | OUTPATIENT
Start: 2024-06-26

## 2024-06-26 RX ORDER — MIDAZOLAM HYDROCHLORIDE 1 MG/ML
INJECTION INTRAMUSCULAR; INTRAVENOUS AS NEEDED
Status: DISCONTINUED | OUTPATIENT
Start: 2024-06-26 | End: 2024-06-26

## 2024-06-26 RX ORDER — ONDANSETRON HYDROCHLORIDE 2 MG/ML
4 INJECTION, SOLUTION INTRAVENOUS ONCE AS NEEDED
Status: CANCELLED | OUTPATIENT
Start: 2024-06-26

## 2024-06-26 ASSESSMENT — PAIN - FUNCTIONAL ASSESSMENT
PAIN_FUNCTIONAL_ASSESSMENT: 0-10
PAIN_FUNCTIONAL_ASSESSMENT: UNABLE TO SELF-REPORT
PAIN_FUNCTIONAL_ASSESSMENT: 0-10
PAIN_FUNCTIONAL_ASSESSMENT: UNABLE TO SELF-REPORT
PAIN_FUNCTIONAL_ASSESSMENT: 0-10

## 2024-06-26 ASSESSMENT — COLUMBIA-SUICIDE SEVERITY RATING SCALE - C-SSRS
6. HAVE YOU EVER DONE ANYTHING, STARTED TO DO ANYTHING, OR PREPARED TO DO ANYTHING TO END YOUR LIFE?: NO
2. HAVE YOU ACTUALLY HAD ANY THOUGHTS OF KILLING YOURSELF?: NO
1. IN THE PAST MONTH, HAVE YOU WISHED YOU WERE DEAD OR WISHED YOU COULD GO TO SLEEP AND NOT WAKE UP?: NO

## 2024-06-26 ASSESSMENT — PAIN SCALES - GENERAL
PAINLEVEL_OUTOF10: 0 - NO PAIN

## 2024-06-26 NOTE — ANESTHESIA POSTPROCEDURE EVALUATION
Patient: Dany Lawson    Procedure Summary       Date: 06/26/24 Room / Location: [facility] A OR 02 / [facility] A OR    Anesthesia Start: 1427 Anesthesia Stop: 1514    Procedure: Left Foot CO2 Lesion Ablation (Left: Foot) Diagnosis:       Plantar warts      Pain in left foot      (Plantar warts [B07.0])      (Pain in left foot [M79.672])    Surgeons: Filipe Solis DPM Responsible Provider: Abad Henry MD    Anesthesia Type: MAC ASA Status: 1            Anesthesia Type: MAC    Vitals Value Taken Time   BP  06/26/24 1519   Temp  06/26/24 1519   Pulse  06/26/24 1519   Resp  06/26/24 1519   SpO2  06/26/24 1519       Anesthesia Post Evaluation    Patient location during evaluation: bedside  Patient participation: complete - patient participated  Level of consciousness: awake  Pain management: adequate  Airway patency: patent  Cardiovascular status: acceptable  Respiratory status: acceptable  Hydration status: acceptable  Postoperative Nausea and Vomiting: none        No notable events documented.

## 2024-06-26 NOTE — ANESTHESIA PREPROCEDURE EVALUATION
Patient: Dany Lawson    Procedure Information       Date/Time: 06/26/24 1300    Procedure: Left Foot CO2 Lesion Ablation (Left: Foot)    Location: [facility] A OR 02 / Virtual [facility] A OR    Surgeons: Filipe Solis DPM            Relevant Problems   ID   (+) Plantar wart of left foot   (+) Plantar warts       Clinical information reviewed:   Tobacco  Allergies  Meds   Med Hx  Surg Hx   Fam Hx  Soc Hx        NPO Detail:  NPO/Void Status  Carbohydrate Drink Given Prior to Surgery? : N  Date of Last Liquid: 06/26/24  Time of Last Liquid: 1000  Date of Last Solid: 06/25/24  Time of Last Solid: 2230  Last Intake Type: Clear fluids  Time of Last Void: 1141         Physical Exam    Airway  Mallampati: II  TM distance: >3 FB  Neck ROM: full     Cardiovascular   Rhythm: regular  Rate: normal     Dental    Pulmonary   Breath sounds clear to auscultation     Abdominal            Anesthesia Plan    History of general anesthesia?: yes  History of complications of general anesthesia?: no    ASA 1     MAC     intravenous induction   Anesthetic plan and risks discussed with patient.    Plan discussed with CRNA.

## 2024-06-26 NOTE — DISCHARGE INSTRUCTIONS
Podiatry follow up and surgical wound care instructions:  - Please keep dressing dry, clean and intact in between changes  - Please change the dressing starting on Thursday 06/27/2024 using Silvadene (given to patient spouse), 4x4, kerlix and ace  - Lalito bearing status: weight bearing to the left heel in surgical shoe  - Please use crutches when needed   - Please contact Dr. Solis to confirm your appointment on Monday July/ 01/2024 to discuss next steps of treatment

## 2024-06-26 NOTE — PERIOPERATIVE NURSING NOTE
1615 Assuming care of pt at this time. Bedside report received from outgoing RN.  1625 given coffee, water, pudding and shaunna crackers   1630 Pt meets discharge criteria from phase 1 at this time   1631 into phase 2     1635 pt wife at bedside, Discharge instructions provided to pt and family. Educated on medications, effects of anesthesia, and homecoming care. Pt and family verbalizing understanding of all instructions provided at this time. All questions and concerns answered. Pt given contact information for provider.   1640 Pt assisted with dressing with help of family. IV removed dressing applied.     1715 Pt assisted to main lobby via wc by staff. Dc in stable condition to home. All belongings taken with pt.

## 2024-06-26 NOTE — OP NOTE
Left Foot CO2 Lesion Ablation (L) Operative Note     Date: 2024  OR Location: [facility] A OR    Name: Dany Lawson, : 1990, Age: 34 y.o., MRN: [id number], Sex: male    Diagnosis  Pre-op Diagnosis     * Plantar warts [B07.0]     * Pain in left foot [M79.672] Post-op Diagnosis     * Plantar warts [B07.0]     * Pain in left foot [M79.672]     Procedures  Left Foot CO2 Lesion Ablation  13563 - IL DESTRUCTION BENIGN LESIONS UP TO 14      Surgeons      * Filipe Solis - Primary    Resident/Fellow/Other Assistant:  Surgeons and Role:     * Eddie Ray DPM - Resident - Assisting  Karol Alva MS-4- assisting     Procedure Summary  Anesthesia: Monitor Anesthesia Care  ASA: I  Anesthesia Staff: Anesthesiologist: Abad Henry MD; Kuldeep Mello MD  C-AA: JORGE Rocha  Estimated Blood Loss: 3mL  Intra-op Medications:   Administrations occurring from 1300 to 1415 on 24:   Medication Name Total Dose   sodium chloride 0.9 % irrigation solution 1,000 mL              Anesthesia Record               Intraprocedure I/O Totals          Intake    LR infusion 500.00 mL    Total Intake 500 mL          Specimen:   ID Type Source Tests Collected by Time   1 : Left Plantar Foot Wart Tissue SOFT TISSUE RESECTION SURGICAL PATHOLOGY EXAM Filipe Solis DPM 2024 1456        Staff:   Circulator: Sally  Circulator: Yue  Scrub Person: Rosa Hoffman Scrub: Carmencita Hoffman Circulator: Maru       Drains and/or Catheters: * None in log *    Tourniquet Times:   * Missing tourniquet times found for documented tourniquets in lo *     Implants: none     Findings: consistent with clinical findings     Indications: Dany Lawson is an 34 y.o. male who is having surgery for Plantar warts [B07.0]  Pain in left foot [M79.672].      Procedure Details:       This is a 34 year old male who was admitted to [facility] for left foot wart excision and laser treatment.   Patient has been suffering with  pain and failed conservative therapy to heal the wart. Patient failed conservative care and desired to have surgical correction at this time.    The nature of the infection, problems, anticipated procedures, post-operative recovery/convalescence, risk/complications, including but not limited to: numbness, CRPS, problems healing soft tissue, post-op wound infection/dehiscence, need IV or oral antibiotic, DVT, PE, persistent pain, further surgeries, excess bills, disability have been explained to the patient in detail. All questions were answered to patient satisfaction. No promises or guarantees were given or implied, patient understands.    Well padded Ankle tourniquet applied, LLE.     Pre- op injection of 10 cc of 1:1 mixed of 0.5% marcaine with 1% lidocaine into 1st and 2nd interspace.     The left lower extremity was scrubbed, prepped, and draped in the usual aseptic manner.   The LLE exsanguinated, elevated and the tourniquet inflated.   Procedure:    Using #15 blade, the wart tissue was circumscribed and removed in its entirety down to subcutaneous tissues. Tissue was passed to the back table as soft tissue specimen for micro and path testing. Next, curette was used to debride the base of the wart. Next, laser was applied to the entire base of the excised tissue to treat the tissues. Laser was set up at 10 fisher. Next, the tissue was evaluated and a second round of debridement was done using curette. Again, using the lasr at a lower setting, 7 fisher, the entire base was treated.     All surgical sites irrigated using copious amount of saline. Throughout the entire procedure, special attention was directed to critical anatomical structures as well as neurovascular bundled and were carefully protected, thru out the procedure.      The tourniquet deflated, immediate hyperemic noted, LLE digits and pulses were palpable.     Sited again was flushed and hemostasis achieved using electrical cautery. Applied  silvadene, 4x4, kerlix, soft cast padding and ace.        The left foot was cleaned using wet then dry lap.        Patient was transferred to PACU with vital signs and vascular status intact to all digits, LLE, for further monitoring prior to discharge. The patient tolerated the procedure and anesthesia well in apparent satisfactory condition.The patient will be left heel weight bearing to the left lower extremity and will follow up next week- refer to discharge instruction details.        Complications:  None; patient tolerated the procedure well.    Disposition: PACU - hemodynamically stable.  Condition: stable     Additional Details: n/a    Attending Attestation:     Filipe Solis  Phone Number: [phone]

## 2024-06-27 ENCOUNTER — PREP FOR PROCEDURE (OUTPATIENT)
Dept: PODIATRY | Facility: CLINIC | Age: 34
End: 2024-06-27
Payer: COMMERCIAL

## 2024-06-28 ENCOUNTER — APPOINTMENT (OUTPATIENT)
Dept: PODIATRY | Facility: CLINIC | Age: 34
End: 2024-06-28
Payer: COMMERCIAL

## 2024-07-01 ENCOUNTER — APPOINTMENT (OUTPATIENT)
Dept: PODIATRY | Facility: CLINIC | Age: 34
End: 2024-07-01
Payer: COMMERCIAL

## 2024-07-01 DIAGNOSIS — B07.0 PLANTAR WART OF LEFT FOOT: Primary | ICD-10-CM

## 2024-07-01 PROCEDURE — 99024 POSTOP FOLLOW-UP VISIT: CPT | Performed by: PODIATRIST

## 2024-07-01 NOTE — PROGRESS NOTES
CC: pov    HPI:  Pateint  presents  pov 1 s/p co2 laser ablation, has been doing the wound care and silavdene, off loading the foot, denies and n/v/f/c.    PCP: Dr. Lazcano  Last visit: 2024     PMH  Past Medical History:   Diagnosis Date    Hematochezia 08/10/2023     [facility]    Current Outpatient Medications:     calcium carbonate-vitamin D3 500 mg-5 mcg (200 unit) tablet, Take 1 tablet by mouth once daily., Disp: , Rfl:     HYDROcodone-acetaminophen (Norco) 5-325 mg tablet, Take 1 tablet by mouth every 6 hours if needed for severe pain (7 - 10) or moderate pain (4 - 6)., Disp: 12 tablet, Rfl: 0    Metamucil Sugar-Free, aspart, 3.4 gram/5.8 gram powder, Take 3,400 mg by mouth 2 times a week., Disp: , Rfl:     omega 3-dha-epa-fish oil (Fish OiL) 1,200 (144-216) mg capsule, Take by mouth., Disp: , Rfl:   Allergies  No Known Allergies  Social History     Socioeconomic History    Marital status:      Spouse name: Not on file    Number of children: Not on file    Years of education: Not on file    Highest education level: Not on file   Occupational History    Occupation: TEACHER   Tobacco Use    Smoking status: Never     Passive exposure: Never    Smokeless tobacco: Never   Vaping Use    Vaping status: Never Used   Substance and Sexual Activity    Alcohol use: Yes     Alcohol/week: 1.0 standard drink of alcohol     Types: 1 Glasses of wine per week     Comment: once a week    Drug use: Never    Sexual activity: Yes     Partners: Female   Other Topics Concern    Not on file   Social History Narrative    Not on file     Social Determinants of Health     Financial Resource Strain: Not on file   Food Insecurity: Not on file   Transportation Needs: Not on file   Physical Activity: Not on file   Stress: Not on file   Social Connections: Not on file   Intimate Partner Violence: Not on file   Housing Stability: Not on file     Family History   Problem Relation Name Age of Onset    Diabetes Father      Cancer Paternal  Grandmother      Cancer Paternal Grandfather       Past Surgical History:   Procedure Laterality Date    HERNIA REPAIR  2007    WISDOM TOOTH EXTRACTION  2011       REVIEW OF SYSTEMS    + as noted above in HPI.       Physical examination:   On General Observation: Patient is a pleasant, cooperative, well developed 34 y.o.  adult male. The patient is alert and oriented to time, place and person. Patient has normal affect and mood.  There were no vitals taken for this visit.    Vascular:  DP and PT pulses are 2/4 b/l.  Mild left foot edema noted. no varicosities b/l.  CFT  5 seconds to all digits bilateral.  Skin temperature is warm to warm from proximal to distal bilateral.      Muscular: Strength is 5/5 b/l.  Motor strength is normal and symmetric proximally, as well as distally, in all four extremities. Good mobility of all extremities.      Neuro:  Proprioception present.   Sensation to vibration is  present. Protective sensation present  at all pedal sites via [place] Tobin 5.07 monofilament bilateral.  Light touch intact bilateral.     Derm:  Surgical site is present plantar left foot, granular tissue present with slight fibrotic tissue, no odor or drainage present, no streaking.        ASSESSMENT:    S/P CO2 laser plantar warts left foot     PLAN:   POV  Sterile dressing change, will start 5 min soaks with epsom salts, adaptic, silvadene and dry dressing, continue the surgical shoe and offlloading, will follow up 1 week. Call if any issues, path report still pending.          Filipe Solis DPM

## 2024-07-08 ENCOUNTER — APPOINTMENT (OUTPATIENT)
Dept: PODIATRY | Facility: CLINIC | Age: 34
End: 2024-07-08
Payer: COMMERCIAL

## 2024-07-08 DIAGNOSIS — B07.0 PLANTAR WART OF LEFT FOOT: Primary | ICD-10-CM

## 2024-07-08 PROCEDURE — 1036F TOBACCO NON-USER: CPT | Performed by: PODIATRIST

## 2024-07-08 PROCEDURE — 99024 POSTOP FOLLOW-UP VISIT: CPT | Performed by: PODIATRIST

## 2024-07-08 NOTE — PROGRESS NOTES
CC: pov     HPI:  Pateint  presents  pov 2 s/p co2 laser ablation, has been doing the wound care and silavdene, off loading the foot, denies and n/v/f/c. Pain is less and starting to bear weight.     PCP: Dr. Lazcano  Last visit: 2024      [facility]  Medical History        Past Medical History:   Diagnosis Date    Hematochezia 08/10/2023         [facility]     Current Outpatient Medications:     calcium carbonate-vitamin D3 500 mg-5 mcg (200 unit) tablet, Take 1 tablet by mouth once daily., Disp: , Rfl:     HYDROcodone-acetaminophen (Norco) 5-325 mg tablet, Take 1 tablet by mouth every 6 hours if needed for severe pain (7 - 10) or moderate pain (4 - 6)., Disp: 12 tablet, Rfl: 0    Metamucil Sugar-Free, aspart, 3.4 gram/5.8 gram powder, Take 3,400 mg by mouth 2 times a week., Disp: , Rfl:     omega 3-dha-epa-fish oil (Fish OiL) 1,200 (144-216) mg capsule, Take by mouth., Disp: , Rfl:   Allergies  No Known Allergies  Social History   Social History            Socioeconomic History    Marital status:        Spouse name: Not on file    Number of children: Not on file    Years of education: Not on file    Highest education level: Not on file   Occupational History    Occupation: TEACHER   Tobacco Use    Smoking status: Never       Passive exposure: Never    Smokeless tobacco: Never   Vaping Use    Vaping status: Never Used   Substance and Sexual Activity    Alcohol use: Yes       Alcohol/week: 1.0 standard drink of alcohol       Types: 1 Glasses of wine per week       Comment: once a week    Drug use: Never    Sexual activity: Yes       Partners: Female   Other Topics Concern    Not on file   Social History Narrative    Not on file      Social Determinants of Health      Financial Resource Strain: Not on file   Food Insecurity: Not on file   Transportation Needs: Not on file   Physical Activity: Not on file   Stress: Not on file   Social Connections: Not on file   Intimate Partner Violence: Not on file   Housing Stability:  Not on file         Family History          Family History   Problem Relation Name Age of Onset    Diabetes Father        Cancer Paternal Grandmother        Cancer Paternal Grandfather             Surgical History         Past Surgical History:   Procedure Laterality Date    HERNIA REPAIR   2007    WISDOM TOOTH EXTRACTION   2011            REVIEW OF SYSTEMS     + as noted above in HPI.         Physical examination:   On General Observation: Patient is a pleasant, cooperative, well developed 34 y.o.  adult male. The patient is alert and oriented to time, place and person. Patient has normal affect and mood.  There were no vitals taken for this visit.     Vascular:  DP and PT pulses are 2/4 b/l.  Mild left foot edema noted. no varicosities b/l.  CFT  5 seconds to all digits bilateral.  Skin temperature is warm to warm from proximal to distal bilateral.       Muscular: Strength is 5/5 b/l.  Motor strength is normal and symmetric proximally, as well as distally, in all four extremities. Good mobility of all extremities.       Neuro:  Proprioception present.   Sensation to vibration is  present. Protective sensation present  at all pedal sites via [place] Tobin 5.07 monofilament bilateral.  Light touch intact bilateral.      Derm:  Surgical site is present plantar left foot, granular tissue present with slight eschar present, no odor or drainage present, no streaking.           ASSESSMENT:    S/P CO2 laser plantar warts left foot      PLAN:   POV  Sterile dressing change, will start 5 min soaks with epsom salts, adaptic, silvadene and dry dressing, continue the surgical shoe and offlloading, will follow up 1 week. Will leave open for 2-3 hours per day. Call if any issues, Reviewed path report with pt.              Filipe Solis DPM

## 2024-07-15 ENCOUNTER — APPOINTMENT (OUTPATIENT)
Dept: PODIATRY | Facility: CLINIC | Age: 34
End: 2024-07-15
Payer: COMMERCIAL

## 2024-07-15 DIAGNOSIS — B07.0 PLANTAR WART OF LEFT FOOT: Primary | ICD-10-CM

## 2024-07-15 PROCEDURE — 1036F TOBACCO NON-USER: CPT | Performed by: PODIATRIST

## 2024-07-15 PROCEDURE — 99024 POSTOP FOLLOW-UP VISIT: CPT | Performed by: PODIATRIST

## 2024-07-15 NOTE — PROGRESS NOTES
CC: pov     HPI:  Pateint  presents  pov 3 s/p co2 laser ablation, has been doing the wound care and silavdene, off loading the foot, denies and n/v/f/c. Pain is less and starting to bear weight.     PCP: Dr. Lazcano  Last visit: 2024      [facility]  Medical History           Past Medical History:   Diagnosis Date    Hematochezia 08/10/2023         [facility]     Current Outpatient Medications:     calcium carbonate-vitamin D3 500 mg-5 mcg (200 unit) tablet, Take 1 tablet by mouth once daily., Disp: , Rfl:     HYDROcodone-acetaminophen (Norco) 5-325 mg tablet, Take 1 tablet by mouth every 6 hours if needed for severe pain (7 - 10) or moderate pain (4 - 6)., Disp: 12 tablet, Rfl: 0    Metamucil Sugar-Free, aspart, 3.4 gram/5.8 gram powder, Take 3,400 mg by mouth 2 times a week., Disp: , Rfl:     omega 3-dha-epa-fish oil (Fish OiL) 1,200 (144-216) mg capsule, Take by mouth., Disp: , Rfl:   Allergies  No Known Allergies  Social History   Social History                Socioeconomic History    Marital status:        Spouse name: Not on file    Number of children: Not on file    Years of education: Not on file    Highest education level: Not on file   Occupational History    Occupation: TEACHER   Tobacco Use    Smoking status: Never       Passive exposure: Never    Smokeless tobacco: Never   Vaping Use    Vaping status: Never Used   Substance and Sexual Activity    Alcohol use: Yes       Alcohol/week: 1.0 standard drink of alcohol       Types: 1 Glasses of wine per week       Comment: once a week    Drug use: Never    Sexual activity: Yes       Partners: Female   Other Topics Concern    Not on file   Social History Narrative    Not on file      Social Determinants of Health      Financial Resource Strain: Not on file   Food Insecurity: Not on file   Transportation Needs: Not on file   Physical Activity: Not on file   Stress: Not on file   Social Connections: Not on file   Intimate Partner Violence: Not on file   Housing  Stability: Not on file         Family History               Family History   Problem Relation Name Age of Onset    Diabetes Father        Cancer Paternal Grandmother        Cancer Paternal Grandfather             Surgical History             Past Surgical History:   Procedure Laterality Date    HERNIA REPAIR   2007    WISDOM TOOTH EXTRACTION   2011            REVIEW OF SYSTEMS     + as noted above in HPI.         Physical examination:   On General Observation: Patient is a pleasant, cooperative, well developed 34 y.o.  adult male. The patient is alert and oriented to time, place and person. Patient has normal affect and mood.  There were no vitals taken for this visit.     Vascular:  DP and PT pulses are 2/4 b/l.  Mild left foot edema noted. no varicosities b/l.  CFT  5 seconds to all digits bilateral.  Skin temperature is warm to warm from proximal to distal bilateral.       Muscular: Strength is 5/5 b/l.  Motor strength is normal and symmetric proximally, as well as distally, in all four extremities. Good mobility of all extremities.       Neuro:  Proprioception present.   Sensation to vibration is  present. Protective sensation present  at all pedal sites via [place] Tobin 5.07 monofilament bilateral.  Light touch intact bilateral.      Derm:  Surgical site is present plantar left foot, increased granular tissue present with slight eschar present, no odor or drainage present, no streaking. 1cm by 1.6cm.           ASSESSMENT:    S/P CO2 laser plantar warts left foot      PLAN:   POV  Sterile dressing change, will start 5 min soaks with epsom salts, adaptic, silvadene and dry dressing, continue the wound care in the am and clean in pm, leave open to air at night, can start tennis shoe well padded, will send picture via my chart next week, will follow up once returns from [place].              Filipe Solis DPM   Observation and assessment/Teaching and training

## 2024-08-12 ENCOUNTER — APPOINTMENT (OUTPATIENT)
Dept: PRIMARY CARE | Facility: CLINIC | Age: 34
End: 2024-08-12
Payer: COMMERCIAL

## 2024-10-16 ENCOUNTER — OFFICE VISIT (OUTPATIENT)
Dept: URGENT CARE | Age: 34
End: 2024-10-16
Payer: COMMERCIAL

## 2024-10-16 VITALS
SYSTOLIC BLOOD PRESSURE: 142 MMHG | OXYGEN SATURATION: 97 % | HEART RATE: 58 BPM | TEMPERATURE: 97 F | DIASTOLIC BLOOD PRESSURE: 87 MMHG

## 2024-10-16 DIAGNOSIS — H18.821 CORNEAL ABRASION DUE TO CONTACT LENS, RIGHT: Primary | ICD-10-CM

## 2024-10-16 DIAGNOSIS — T15.91XA FOREIGN BODY, EYE, RIGHT, INITIAL ENCOUNTER: ICD-10-CM

## 2024-10-16 RX ORDER — OFLOXACIN 3 MG/ML
2 SOLUTION/ DROPS OPHTHALMIC 4 TIMES DAILY
Qty: 10 ML | Refills: 0 | Status: SHIPPED | OUTPATIENT
Start: 2024-10-16 | End: 2024-10-23

## 2024-10-16 ASSESSMENT — ENCOUNTER SYMPTOMS
NAUSEA: 0
HEADACHES: 0
SHORTNESS OF BREATH: 0
PHOTOPHOBIA: 0
EYE PAIN: 1
FEVER: 0
SORE THROAT: 0
DIARRHEA: 0
CHILLS: 0
VOMITING: 0

## 2024-10-16 NOTE — PROGRESS NOTES
Subjective   Patient ID: Dany Lawson is a 34 y.o. male. They present today with a chief complaint of Eye Problem (Contact ripped rt eye. Pain.).    History of Present Illness  Patient presents with suspected foreign body in right eye. Patient explains that he believes he may have ripped his contact in half and scratches his eye. Denies blurred vision, double vision. Denies photophobia. Denies uri symptoms. Patient explains that he is an every day contact lens wearer.       Eye Problem  Associated symptoms: no headaches, no nausea, no photophobia and no vomiting        Past Medical History  Allergies as of 10/16/2024    (No Known Allergies)       (Not in a hospital admission)       Past Medical History:   Diagnosis Date    Hematochezia 08/10/2023       Past Surgical History:   Procedure Laterality Date    HERNIA REPAIR  2007    WISDOM TOOTH EXTRACTION  2011        reports that he has never smoked. He has never been exposed to tobacco smoke. He has never used smokeless tobacco. He reports current alcohol use of about 1.0 standard drink of alcohol per week. He reports that he does not use drugs.    Review of Systems  Review of Systems   Constitutional:  Negative for chills and fever.   HENT:  Negative for congestion and sore throat.    Eyes:  Positive for pain. Negative for photophobia and visual disturbance.   Respiratory:  Negative for shortness of breath.    Cardiovascular:  Negative for chest pain.   Gastrointestinal:  Negative for diarrhea, nausea and vomiting.   Neurological:  Negative for headaches.                                  Objective    Vitals:    10/16/24 1655   BP: 142/87   Pulse: 58   Temp: 36.1 °C (97 °F)   SpO2: 97%     No LMP for male patient.    Physical Exam  Constitutional:       General: He is not in acute distress.     Appearance: He is not ill-appearing.   Eyes:      General:         Left eye: Foreign body present.No discharge.      Extraocular Movements: Extraocular movements intact.       Conjunctiva/sclera:      Left eye: Left conjunctiva is injected.      Pupils: Pupils are equal, round, and reactive to light.      Comments: Fluorescin stain aided in identification of foreign body, half of contact lens in lateral lower lid, corneal abrasion noted at 7:00 on right eye.    Neurological:      Mental Status: He is alert.         Ocular Foreign Body Removal    Date/Time: 10/16/2024 5:12 PM    Performed by: Paola Woodson PA-C  Authorized by: Paola Woodson PA-C    Consent:     Consent obtained:  Verbal  Location:     Location:  R lower eyelid    Depth:  Superficial  Pre-procedure details:     Fluorescein exam: yes      Fluorescein uptake: yes      Corneal abrasion location:  Lateral  Anesthesia:     Local anesthetic:  None  Procedure details:     Localization method:  Fluorescein    Removal mechanism:  Sterile cotton-tip applicator    Foreign bodies recovered:  1    Description:  Half of contact lens    Intact foreign body removal: yes    Post-procedure details:     Dressing:  Antibiotic drops    Procedure completion:  Tolerated well, no immediate complications      Point of Care Test & Imaging Results from this visit  No results found for this visit on 10/16/24.   No results found.    Diagnostic study results (if any) were reviewed by Paola Woodson PA-C.    Assessment/Plan   Allergies, medications, history, and pertinent labs/EKGs/Imaging reviewed by Paola Woodson PA-C.     Medical Decision Making  MDM- Signs and symptoms consistent with corneal abrasion secondary to foreign body, fb removed successfully in clinic, see procedure note.  Will treat with antibiotic drops prophylactically. Follow with PCP/ophthalmology within 3-5 days for recheck. Return to clinic or go to ED if symptoms change or worsen.  Patient verbalized understanding and agrees with plan.       Orders and Diagnoses  Diagnoses and all orders for this visit:  Corneal abrasion due to contact lens, right  -      ofloxacin (Ocuflox) 0.3 % ophthalmic solution; Administer 2 drops into the right eye 4 times a day for 7 days.  Foreign body, eye, right, initial encounter  Other orders  -     Ocular Foreign Body Removal      Medical Admin Record      Patient disposition: Home    Electronically signed by Paola Woodson PA-C  5:14 PM

## 2024-10-21 ENCOUNTER — OFFICE VISIT (OUTPATIENT)
Dept: URGENT CARE | Age: 34
End: 2024-10-21
Payer: COMMERCIAL

## 2024-10-21 VITALS
HEART RATE: 75 BPM | RESPIRATION RATE: 16 BRPM | DIASTOLIC BLOOD PRESSURE: 76 MMHG | SYSTOLIC BLOOD PRESSURE: 113 MMHG | OXYGEN SATURATION: 96 % | TEMPERATURE: 97.7 F

## 2024-10-21 DIAGNOSIS — J02.9 SORE THROAT: ICD-10-CM

## 2024-10-21 DIAGNOSIS — J02.0 STREP PHARYNGITIS: Primary | ICD-10-CM

## 2024-10-21 LAB — POC RAPID STREP: POSITIVE

## 2024-10-21 PROCEDURE — 87880 STREP A ASSAY W/OPTIC: CPT

## 2024-10-21 PROCEDURE — 99213 OFFICE O/P EST LOW 20 MIN: CPT

## 2024-10-21 RX ORDER — AMOXICILLIN AND CLAVULANATE POTASSIUM 875; 125 MG/1; MG/1
1 TABLET, FILM COATED ORAL 2 TIMES DAILY
Qty: 20 TABLET | Refills: 0 | Status: SHIPPED | OUTPATIENT
Start: 2024-10-21 | End: 2024-10-31

## 2024-10-21 ASSESSMENT — ENCOUNTER SYMPTOMS
CHILLS: 0
FEVER: 0
NAUSEA: 1
SINUS PAIN: 0
VOMITING: 1
RHINORRHEA: 0
SHORTNESS OF BREATH: 0
SORE THROAT: 1
COUGH: 1
APPETITE CHANGE: 0
WHEEZING: 0
DIARRHEA: 0
PALPITATIONS: 0

## 2024-10-21 NOTE — LETTER
October 21, 2024     Patient: Dany Lawson   YOB: 1990   Date of Visit: 10/21/2024       To Whom It May Concern:    Dany Lawson was seen in my clinic on 10/21/2024. Please excuse Dany for his absence from work until 10/23/2024             Sincerely,         Paola Woodson PA-C        CC: No Recipients

## 2024-10-21 NOTE — PROGRESS NOTES
Subjective   Patient ID: Dany Lawson is a 34 y.o. male. They present today with a chief complaint of Sore Throat (X 1 day).    History of Present Illness  Patient presents with one day of sore throat. Claims some congestion and slight cough. Claims nausea without vomiting. Denies chest pain, sob. Denies fever, chills, sweats. Works as a .       Sore Throat   Associated symptoms include congestion, coughing and vomiting. Pertinent negatives include no diarrhea, ear pain or shortness of breath.       Past Medical History  Allergies as of 10/21/2024    (No Known Allergies)       (Not in a hospital admission)       Past Medical History:   Diagnosis Date    Hematochezia 08/10/2023       Past Surgical History:   Procedure Laterality Date    HERNIA REPAIR  2007    WISDOM TOOTH EXTRACTION  2011        reports that he has never smoked. He has never been exposed to tobacco smoke. He has never used smokeless tobacco. He reports current alcohol use of about 1.0 standard drink of alcohol per week. He reports that he does not use drugs.    Review of Systems  Review of Systems   Constitutional:  Negative for appetite change, chills and fever.   HENT:  Positive for congestion and sore throat. Negative for ear pain, rhinorrhea and sinus pain.    Respiratory:  Positive for cough. Negative for shortness of breath and wheezing.    Cardiovascular:  Negative for chest pain and palpitations.   Gastrointestinal:  Positive for nausea and vomiting. Negative for diarrhea.                                  Objective    Vitals:    10/21/24 0824   BP: 113/76   Pulse: 75   Resp: 16   Temp: 36.5 °C (97.7 °F)   SpO2: 96%     No LMP for male patient.    Physical Exam  Constitutional:       General: He is not in acute distress.     Appearance: He is normal weight. He is not toxic-appearing.   HENT:      Right Ear: Tympanic membrane and external ear normal.      Left Ear: Tympanic membrane and external ear normal.      Nose: Nose  normal.      Mouth/Throat:      Mouth: Mucous membranes are moist.      Pharynx: Uvula midline. Oropharyngeal exudate and posterior oropharyngeal erythema present.   Cardiovascular:      Rate and Rhythm: Normal rate and regular rhythm.   Pulmonary:      Effort: Pulmonary effort is normal. No respiratory distress.      Breath sounds: Normal breath sounds. No wheezing.   Musculoskeletal:      Cervical back: Normal range of motion.   Lymphadenopathy:      Cervical: Cervical adenopathy present.      Right cervical: Superficial cervical adenopathy present.      Left cervical: Superficial cervical adenopathy present.   Neurological:      Mental Status: He is alert.         Procedures    Point of Care Test & Imaging Results from this visit  Results for orders placed or performed in visit on 10/21/24   POCT rapid strep A manually resulted   Result Value Ref Range    POC Rapid Strep Positive (A) Negative      No results found.    Diagnostic study results (if any) were reviewed by Paola Woodson PA-C.    Assessment/Plan   Allergies, medications, history, and pertinent labs/EKGs/Imaging reviewed by Paola Woodson PA-C.     Medical Decision Making  MDM- Signs, symptoms, examination, and rapid testing consistent with strep pharyngitis. Patient is without evidence of PTA, deep neck infection, Ludwigs angina, or Sepsis. Patient will be treated with antibiotics and symptomatic support Patient is encouraged to return if symptoms worsen or do not improve. Otherwise follow with PCP. Patient verbalized understanding and agrees with plan.      Orders and Diagnoses  Diagnoses and all orders for this visit:  Strep pharyngitis  -     amoxicillin-pot clavulanate (Augmentin) 875-125 mg tablet; Take 1 tablet by mouth 2 times a day for 10 days.  Sore throat  -     POCT rapid strep A manually resulted      Medical Admin Record      Patient disposition: Home    Electronically signed by Paola Woodson PA-C  8:41 AM

## 2024-10-21 NOTE — PATIENT INSTRUCTIONS
Alternate tylenol/ibuprofen ever 3 hours    Er if temp not reduced with tylenol ibuprofen, chest pain, sob or trouble swallowing,     You should have improvement within 48 hours on antibiotic    Follow up with pcp.

## (undated) DEVICE — HANDPIECE, POOLE SUCTION, W/O TUBING

## (undated) DEVICE — GLOVE, SURGICAL, PROTEXIS PI BLUE W/NEUTHERA, 7.0, PF, LF

## (undated) DEVICE — SUTURE, VICRYL, 4-0, 18 IN, PS2, UNDYED

## (undated) DEVICE — GLOVE, SURGICAL, PROTEXIS PI MICRO, 7.5, PF, LF

## (undated) DEVICE — PAD, GROUNDING, ELECTROSURGICAL, W/9 FT CABLE, POLYHESIVE II, ADULT, LF

## (undated) DEVICE — ELECTRODE, ELECTROSURGICAL, BLADE, STANDARD, 2.75 IN

## (undated) DEVICE — TUBING 10'' W/ WAND STRYKER

## (undated) DEVICE — SUTURE, VICRYL, 3-0,18 IN, SH, UNDYED

## (undated) DEVICE — SOLUTION, IRRIGATION, SODIUM CHLORIDE 0.9%, 1000 ML, POUR BOTTLE

## (undated) DEVICE — TOWEL, SURGICAL, NEURO, O/R, 16 X 26, BLUE, STERILE

## (undated) DEVICE — SUTURE, ETHILON, 4-0, 18, PS2, BLACK

## (undated) DEVICE — KIT, MINOR, DOUBLE BASIN

## (undated) DEVICE — Device